# Patient Record
Sex: MALE | Race: WHITE | NOT HISPANIC OR LATINO | Employment: OTHER | URBAN - METROPOLITAN AREA
[De-identification: names, ages, dates, MRNs, and addresses within clinical notes are randomized per-mention and may not be internally consistent; named-entity substitution may affect disease eponyms.]

---

## 2023-04-14 PROBLEM — E88.810 METABOLIC SYNDROME X: Status: ACTIVE | Noted: 2023-04-14

## 2023-04-14 PROBLEM — E53.8 VITAMIN B12 DEFICIENCY: Status: ACTIVE | Noted: 2023-04-14

## 2023-04-14 PROBLEM — M17.0 PRIMARY OSTEOARTHRITIS OF BOTH KNEES: Status: ACTIVE | Noted: 2023-04-14

## 2023-04-14 PROBLEM — G25.81 RESTLESS LEGS SYNDROME: Status: ACTIVE | Noted: 2023-04-14

## 2023-04-14 PROBLEM — G56.02 SEVERE CARPAL TUNNEL SYNDROME OF LEFT WRIST: Status: ACTIVE | Noted: 2023-04-14

## 2023-04-14 PROBLEM — G60.9 IDIOPATHIC PERIPHERAL NEUROPATHY: Status: ACTIVE | Noted: 2023-04-14

## 2023-04-14 PROBLEM — G56.22 ENTRAPMENT OF LEFT ULNAR NERVE: Status: ACTIVE | Noted: 2023-04-14

## 2023-04-14 PROBLEM — D13.91 AUTOSOMAL RECESSIVE COLORECTAL ADENOMATOUS POLYPOSIS ASSOCIATED WITH MUTATION IN MUTYH GENE: Status: ACTIVE | Noted: 2023-04-14

## 2023-04-14 PROBLEM — I87.2 CHRONIC VENOUS INSUFFICIENCY: Status: ACTIVE | Noted: 2023-04-14

## 2023-04-14 PROBLEM — K64.4 EXTERNAL HEMORRHOID: Status: ACTIVE | Noted: 2023-04-14

## 2023-04-14 PROBLEM — Z95.828 PRESENCE OF IVC FILTER: Status: ACTIVE | Noted: 2023-04-14

## 2023-04-14 PROBLEM — M19.042 PRIMARY OSTEOARTHRITIS OF BOTH HANDS: Status: ACTIVE | Noted: 2023-04-14

## 2023-04-14 PROBLEM — K21.9 CHRONIC GERD: Status: ACTIVE | Noted: 2023-04-14

## 2023-04-14 PROBLEM — R06.2: Status: ACTIVE | Noted: 2023-04-14

## 2023-04-14 PROBLEM — D68.51: Status: ACTIVE | Noted: 2023-04-14

## 2023-04-14 PROBLEM — G56.22 ULNAR TUNNEL SYNDROME OF LEFT WRIST: Status: ACTIVE | Noted: 2023-04-14

## 2023-04-14 PROBLEM — H40.2233 CHRONIC PRIMARY ANGLE-CLOSURE GLAUCOMA OF BOTH EYES, SEVERE STAGE: Status: ACTIVE | Noted: 2023-04-14

## 2023-04-14 PROBLEM — M75.100 ROTATOR CUFF TEAR: Status: ACTIVE | Noted: 2023-04-14

## 2023-04-14 PROBLEM — M15.9 GENERALIZED OSTEOARTHRITIS OF MULTIPLE SITES: Status: ACTIVE | Noted: 2023-04-14

## 2023-04-14 PROBLEM — I26.99 PULMONARY EMBOLUS (MULTI): Status: ACTIVE | Noted: 2023-04-14

## 2023-04-14 PROBLEM — M19.041 PRIMARY OSTEOARTHRITIS OF BOTH HANDS: Status: ACTIVE | Noted: 2023-04-14

## 2023-04-14 PROBLEM — Z15.09 BIALLELIC MUTATION OF MUTYH GENE: Status: ACTIVE | Noted: 2023-04-14

## 2023-04-14 PROBLEM — M67.912 TENDINOPATHY OF ROTATOR CUFF, LEFT: Status: ACTIVE | Noted: 2023-04-14

## 2023-04-14 RX ORDER — WARFARIN SODIUM 5 MG/1
1 TABLET ORAL DAILY
COMMUNITY
Start: 2016-01-13 | End: 2023-05-22 | Stop reason: SDUPTHER

## 2023-04-14 RX ORDER — ALBUTEROL SULFATE 90 UG/1
2 AEROSOL, METERED RESPIRATORY (INHALATION) EVERY 4 HOURS PRN
COMMUNITY
Start: 2022-10-05

## 2023-04-14 RX ORDER — DORZOLAMIDE HCL 20 MG/ML
1 SOLUTION/ DROPS OPHTHALMIC
COMMUNITY
Start: 2022-07-20

## 2023-04-14 RX ORDER — WARFARIN 4 MG/1
1 TABLET ORAL DAILY
COMMUNITY
Start: 2020-03-11 | End: 2023-10-09 | Stop reason: SDUPTHER

## 2023-04-14 RX ORDER — TIZANIDINE 4 MG/1
1 TABLET ORAL 3 TIMES DAILY PRN
COMMUNITY
Start: 2020-06-25 | End: 2023-08-24 | Stop reason: SDUPTHER

## 2023-04-14 RX ORDER — TRAMADOL HYDROCHLORIDE 50 MG/1
50 TABLET ORAL 3 TIMES DAILY
COMMUNITY
Start: 2022-02-22 | End: 2023-05-22 | Stop reason: SDUPTHER

## 2023-04-14 RX ORDER — ACETAMINOPHEN, DEXTROMETHORPHAN HBR, DOXYLAMINE SUCCINATE, PHENYLEPHRINE HCL 650; 20; 12.5; 1 MG/30ML; MG/30ML; MG/30ML; MG/30ML
1 SOLUTION ORAL DAILY
COMMUNITY
Start: 2020-11-11

## 2023-04-14 RX ORDER — LATANOPROST 50 UG/ML
SOLUTION/ DROPS OPHTHALMIC
COMMUNITY
Start: 2016-01-13

## 2023-04-14 RX ORDER — OMEPRAZOLE 20 MG/1
CAPSULE, DELAYED RELEASE ORAL
COMMUNITY
Start: 2020-11-11

## 2023-04-14 RX ORDER — NALOXONE HYDROCHLORIDE 4 MG/.1ML
SPRAY NASAL
COMMUNITY
Start: 2020-03-11

## 2023-04-14 RX ORDER — TIMOLOL MALEATE 2.5 MG/ML
SOLUTION/ DROPS OPHTHALMIC
COMMUNITY
Start: 2021-10-25

## 2023-04-14 RX ORDER — AMITRIPTYLINE HYDROCHLORIDE 10 MG/1
1 TABLET, FILM COATED ORAL NIGHTLY
COMMUNITY
Start: 2022-10-05 | End: 2023-10-09

## 2023-04-14 RX ORDER — PRAMIPEXOLE DIHYDROCHLORIDE 0.25 MG/1
1 TABLET ORAL NIGHTLY
COMMUNITY
Start: 2020-02-12 | End: 2024-02-06 | Stop reason: SDUPTHER

## 2023-04-17 ENCOUNTER — APPOINTMENT (OUTPATIENT)
Dept: PRIMARY CARE | Facility: CLINIC | Age: 68
End: 2023-04-17
Payer: MEDICARE

## 2023-05-22 ENCOUNTER — TELEPHONE (OUTPATIENT)
Dept: PRIMARY CARE | Facility: CLINIC | Age: 68
End: 2023-05-22
Payer: MEDICARE

## 2023-05-22 DIAGNOSIS — Z79.891 CHRONIC USE OF OPIATE DRUG FOR THERAPEUTIC PURPOSE: ICD-10-CM

## 2023-05-22 DIAGNOSIS — Z15.09 BIALLELIC MUTATION OF MUTYH GENE: Primary | ICD-10-CM

## 2023-05-22 DIAGNOSIS — D68.51 THROMBOPHILIA ASSOCIATED WITH DOUBLE HETEROZYGOSITY FOR PROTHROMBIN GENE MUTATION AND FACTOR V LEIDEN MUTATION (MULTI): ICD-10-CM

## 2023-05-22 RX ORDER — WARFARIN SODIUM 5 MG/1
5 TABLET ORAL NIGHTLY
Qty: 30 TABLET | Refills: 1 | Status: SHIPPED | OUTPATIENT
Start: 2023-05-22 | End: 2023-07-10

## 2023-05-22 RX ORDER — TRAMADOL HYDROCHLORIDE 50 MG/1
50 TABLET ORAL 3 TIMES DAILY
Qty: 90 TABLET | Refills: 0 | Status: SHIPPED | OUTPATIENT
Start: 2023-05-22 | End: 2023-08-24

## 2023-05-24 ENCOUNTER — OFFICE VISIT (OUTPATIENT)
Dept: PRIMARY CARE | Facility: CLINIC | Age: 68
End: 2023-05-24
Payer: MEDICARE

## 2023-05-24 VITALS — HEART RATE: 61 BPM | OXYGEN SATURATION: 97 % | DIASTOLIC BLOOD PRESSURE: 74 MMHG | SYSTOLIC BLOOD PRESSURE: 120 MMHG

## 2023-05-24 DIAGNOSIS — D13.91 AUTOSOMAL RECESSIVE COLORECTAL ADENOMATOUS POLYPOSIS ASSOCIATED WITH MUTATION IN MUTYH GENE: ICD-10-CM

## 2023-05-24 DIAGNOSIS — G60.9 IDIOPATHIC PERIPHERAL NEUROPATHY: ICD-10-CM

## 2023-05-24 DIAGNOSIS — H40.2233 CHRONIC PRIMARY ANGLE-CLOSURE GLAUCOMA OF BOTH EYES, SEVERE STAGE: ICD-10-CM

## 2023-05-24 DIAGNOSIS — Z15.09 BIALLELIC MUTATION OF MUTYH GENE: ICD-10-CM

## 2023-05-24 DIAGNOSIS — M15.9 GENERALIZED OSTEOARTHRITIS OF MULTIPLE SITES: ICD-10-CM

## 2023-05-24 DIAGNOSIS — D68.51 THROMBOPHILIA ASSOCIATED WITH DOUBLE HETEROZYGOSITY FOR PROTHROMBIN GENE MUTATION AND FACTOR V LEIDEN MUTATION (MULTI): Primary | ICD-10-CM

## 2023-05-24 DIAGNOSIS — I27.82 OTHER CHRONIC PULMONARY EMBOLISM WITHOUT ACUTE COR PULMONALE (MULTI): ICD-10-CM

## 2023-05-24 DIAGNOSIS — E53.8 VITAMIN B12 DEFICIENCY: ICD-10-CM

## 2023-05-24 PROBLEM — R06.2: Status: RESOLVED | Noted: 2023-04-14 | Resolved: 2023-05-24

## 2023-05-24 LAB — POC INR: 2.8 (ref 0.9–1.1)

## 2023-05-24 PROCEDURE — 1159F MED LIST DOCD IN RCRD: CPT | Performed by: INTERNAL MEDICINE

## 2023-05-24 PROCEDURE — 1160F RVW MEDS BY RX/DR IN RCRD: CPT | Performed by: INTERNAL MEDICINE

## 2023-05-24 PROCEDURE — 3008F BODY MASS INDEX DOCD: CPT | Performed by: INTERNAL MEDICINE

## 2023-05-24 PROCEDURE — 1036F TOBACCO NON-USER: CPT | Performed by: INTERNAL MEDICINE

## 2023-05-24 PROCEDURE — 99213 OFFICE O/P EST LOW 20 MIN: CPT | Performed by: INTERNAL MEDICINE

## 2023-05-24 PROCEDURE — 85610 PROTHROMBIN TIME: CPT | Performed by: INTERNAL MEDICINE

## 2023-05-24 ASSESSMENT — ENCOUNTER SYMPTOMS
PHOTOPHOBIA: 1
NECK PAIN: 1
EYE REDNESS: 1
NECK STIFFNESS: 1
ARTHRALGIAS: 1
COUGH: 1

## 2023-05-24 NOTE — PROGRESS NOTES
Subjective   Michi Mckinley is a 68 y.o. male here for follow-up of chronic anticoagulation for factor V Leiden deficiency.  Bleeding signs/symptoms:     Major bleeding event:    Thrombosis signs/symptoms:    Thromboembolic event:      Missed doses:     Medication changes:     Dietary changes:    Bacterial/viral infection: no  Other concerns:      Review of Systems   HENT:  Positive for neck stiffness.    Eyes:  Positive for photophobia, redness and visual disturbance.   Respiratory:  Positive for cough.    Musculoskeletal:  Positive for arthralgias and neck pain.   All other systems reviewed and are negative.      Objective   Current warfarin (COUMADIN) dose: 5 mg Wednesday and Sunday 4 mg rest  Lab Results   Component Value Date    INR 2.8 (A) 05/24/2023       Assessment/Plan    Therapeutic INR for goal of The patient does not have an active anticoagulation episode.    Dose: no change  Next INR: 1 monthSubjective   Reason for Visit: Michi Mckinley is an 68 y.o. male here for a Medicare Wellness visit.          Reviewed all medications by prescribing practitioner or clinical pharmacist (such as prescriptions, OTCs, herbal therapies and supplements) and documented in the medical record.    HPI    Patient Care Team:  Roly Nance DO as PCP - General  Roly Nance DO as PCP - [facility] Attributed Provider     Review of Systems   Eyes:  Positive for photophobia, redness and visual disturbance.   Respiratory:  Positive for cough.    Musculoskeletal:  Positive for arthralgias, neck pain and neck stiffness.   All other systems reviewed and are negative.      Objective   Vitals:  /74   Pulse 61   SpO2 97%       Physical Exam  Vitals and nursing note reviewed.   Constitutional:       General: He is not in acute distress.     Appearance: Normal appearance. He is well-developed. He is not toxic-appearing.   HENT:      Head: Normocephalic and atraumatic.      Right Ear: Tympanic membrane and  external ear normal.      Left Ear: Tympanic membrane and external ear normal.      Nose: Nose normal.      Mouth/Throat:      Mouth: Mucous membranes are moist.      Pharynx: Oropharynx is clear. No oropharyngeal exudate or posterior oropharyngeal erythema.      Tonsils: No tonsillar exudate. 2+ on the right. 2+ on the left.   Eyes:      Extraocular Movements: Extraocular movements intact.      Conjunctiva/sclera: Conjunctivae normal.   Cardiovascular:      Rate and Rhythm: Normal rate and regular rhythm.      Pulses: Normal pulses.      Heart sounds: Normal heart sounds. No murmur heard.  Pulmonary:      Effort: Pulmonary effort is normal.      Breath sounds: Normal breath sounds.   Abdominal:      General: Abdomen is flat. Bowel sounds are normal.      Palpations: Abdomen is soft.   Musculoskeletal:      Cervical back: Neck supple.   Feet:      Right foot:      Skin integrity: Skin integrity normal. No ulcer, blister, skin breakdown, erythema, warmth or callus.      Toenail Condition: Right toenails are normal.      Left foot:      Skin integrity: Skin integrity normal. No ulcer, blister, skin breakdown, erythema, warmth or callus.      Toenail Condition: Left toenails are normal.   Lymphadenopathy:      Cervical: No cervical adenopathy.   Skin:     General: Skin is warm and dry.      Capillary Refill: Capillary refill takes more than 3 seconds.      Findings: No rash.   Neurological:      Mental Status: He is alert. Mental status is at baseline.      Sensory: Sensation is intact.   Psychiatric:         Mood and Affect: Mood normal.         Behavior: Behavior normal.         Thought Content: Thought content normal.         Judgment: Judgment normal.         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

## 2023-07-10 DIAGNOSIS — D68.51 THROMBOPHILIA ASSOCIATED WITH DOUBLE HETEROZYGOSITY FOR PROTHROMBIN GENE MUTATION AND FACTOR V LEIDEN MUTATION (MULTI): ICD-10-CM

## 2023-07-10 DIAGNOSIS — Z15.09 BIALLELIC MUTATION OF MUTYH GENE: ICD-10-CM

## 2023-07-10 RX ORDER — WARFARIN SODIUM 5 MG/1
TABLET ORAL
Qty: 30 TABLET | Refills: 0 | Status: SHIPPED | OUTPATIENT
Start: 2023-07-10 | End: 2023-08-11

## 2023-08-11 DIAGNOSIS — D68.51 THROMBOPHILIA ASSOCIATED WITH DOUBLE HETEROZYGOSITY FOR PROTHROMBIN GENE MUTATION AND FACTOR V LEIDEN MUTATION (MULTI): ICD-10-CM

## 2023-08-11 DIAGNOSIS — Z15.09 BIALLELIC MUTATION OF MUTYH GENE: ICD-10-CM

## 2023-08-11 RX ORDER — WARFARIN SODIUM 5 MG/1
TABLET ORAL
Qty: 30 TABLET | Refills: 2 | Status: SHIPPED | OUTPATIENT
Start: 2023-08-11 | End: 2023-10-09 | Stop reason: ALTCHOICE

## 2023-08-24 ENCOUNTER — OFFICE VISIT (OUTPATIENT)
Dept: PRIMARY CARE | Facility: CLINIC | Age: 68
End: 2023-08-24
Payer: MEDICARE

## 2023-08-24 VITALS
DIASTOLIC BLOOD PRESSURE: 80 MMHG | BODY MASS INDEX: 30.21 KG/M2 | WEIGHT: 211 LBS | HEART RATE: 68 BPM | SYSTOLIC BLOOD PRESSURE: 130 MMHG | HEIGHT: 70 IN

## 2023-08-24 DIAGNOSIS — D13.91 AUTOSOMAL RECESSIVE COLORECTAL ADENOMATOUS POLYPOSIS ASSOCIATED WITH MUTATION IN MUTYH GENE: ICD-10-CM

## 2023-08-24 DIAGNOSIS — H40.2233 CHRONIC PRIMARY ANGLE-CLOSURE GLAUCOMA OF BOTH EYES, SEVERE STAGE: ICD-10-CM

## 2023-08-24 DIAGNOSIS — G60.9 IDIOPATHIC PERIPHERAL NEUROPATHY: ICD-10-CM

## 2023-08-24 DIAGNOSIS — G25.81 RESTLESS LEGS SYNDROME: ICD-10-CM

## 2023-08-24 DIAGNOSIS — E53.8 VITAMIN B12 DEFICIENCY: ICD-10-CM

## 2023-08-24 DIAGNOSIS — D68.51 THROMBOPHILIA ASSOCIATED WITH DOUBLE HETEROZYGOSITY FOR PROTHROMBIN GENE MUTATION AND FACTOR V LEIDEN MUTATION (MULTI): Primary | ICD-10-CM

## 2023-08-24 DIAGNOSIS — M15.9 GENERALIZED OSTEOARTHRITIS OF MULTIPLE SITES: ICD-10-CM

## 2023-08-24 DIAGNOSIS — I27.82 OTHER CHRONIC PULMONARY EMBOLISM WITHOUT ACUTE COR PULMONALE (MULTI): ICD-10-CM

## 2023-08-24 DIAGNOSIS — Z15.09 BIALLELIC MUTATION OF MUTYH GENE: ICD-10-CM

## 2023-08-24 DIAGNOSIS — E66.09 CLASS 1 OBESITY DUE TO EXCESS CALORIES WITH SERIOUS COMORBIDITY AND BODY MASS INDEX (BMI) OF 30.0 TO 30.9 IN ADULT: ICD-10-CM

## 2023-08-24 PROBLEM — E66.811 CLASS 1 OBESITY DUE TO EXCESS CALORIES WITH SERIOUS COMORBIDITY AND BODY MASS INDEX (BMI) OF 30.0 TO 30.9 IN ADULT: Status: ACTIVE | Noted: 2023-08-24

## 2023-08-24 LAB — POC INR: 3.5 (ref 0.9–1.1)

## 2023-08-24 PROCEDURE — 3008F BODY MASS INDEX DOCD: CPT | Performed by: INTERNAL MEDICINE

## 2023-08-24 PROCEDURE — 1036F TOBACCO NON-USER: CPT | Performed by: INTERNAL MEDICINE

## 2023-08-24 PROCEDURE — 99213 OFFICE O/P EST LOW 20 MIN: CPT | Performed by: INTERNAL MEDICINE

## 2023-08-24 PROCEDURE — 1160F RVW MEDS BY RX/DR IN RCRD: CPT | Performed by: INTERNAL MEDICINE

## 2023-08-24 PROCEDURE — 1159F MED LIST DOCD IN RCRD: CPT | Performed by: INTERNAL MEDICINE

## 2023-08-24 PROCEDURE — 85610 PROTHROMBIN TIME: CPT | Performed by: INTERNAL MEDICINE

## 2023-08-24 RX ORDER — TIZANIDINE 4 MG/1
4 TABLET ORAL 3 TIMES DAILY PRN
Qty: 30 TABLET | Refills: 2 | Status: SHIPPED | OUTPATIENT
Start: 2023-08-24 | End: 2024-01-08 | Stop reason: SDUPTHER

## 2023-08-24 RX ORDER — TIZANIDINE 4 MG/1
4 TABLET ORAL 3 TIMES DAILY PRN
Qty: 30 TABLET | Refills: 2 | Status: CANCELLED | OUTPATIENT
Start: 2023-08-24

## 2023-08-24 ASSESSMENT — ANXIETY QUESTIONNAIRES
7. FEELING AFRAID AS IF SOMETHING AWFUL MIGHT HAPPEN: NOT AT ALL
5. BEING SO RESTLESS THAT IT IS HARD TO SIT STILL: NOT AT ALL
2. NOT BEING ABLE TO STOP OR CONTROL WORRYING: NOT AT ALL
GAD7 TOTAL SCORE: 0
1. FEELING NERVOUS, ANXIOUS, OR ON EDGE: NOT AT ALL
6. BECOMING EASILY ANNOYED OR IRRITABLE: NOT AT ALL
3. WORRYING TOO MUCH ABOUT DIFFERENT THINGS: NOT AT ALL
IF YOU CHECKED OFF ANY PROBLEMS ON THIS QUESTIONNAIRE, HOW DIFFICULT HAVE THESE PROBLEMS MADE IT FOR YOU TO DO YOUR WORK, TAKE CARE OF THINGS AT HOME, OR GET ALONG WITH OTHER PEOPLE: NOT DIFFICULT AT ALL
4. TROUBLE RELAXING: NOT AT ALL

## 2023-08-24 ASSESSMENT — PATIENT HEALTH QUESTIONNAIRE - PHQ9
2. FEELING DOWN, DEPRESSED OR HOPELESS: NOT AT ALL
1. LITTLE INTEREST OR PLEASURE IN DOING THINGS: NOT AT ALL
SUM OF ALL RESPONSES TO PHQ9 QUESTIONS 1 AND 2: 0

## 2023-08-24 ASSESSMENT — ENCOUNTER SYMPTOMS
DEPRESSION: 0
OCCASIONAL FEELINGS OF UNSTEADINESS: 0
LOSS OF SENSATION IN FEET: 0

## 2023-08-24 NOTE — PROGRESS NOTES
"Subjective   Patient ID: Michi Mckinley is a 68 y.o. male who presents for Follow-up.  INR 3.5 4mg M,T,Th,F,Sa 5mg Dick,W  HPI     Review of Systems    Objective   /80 (BP Location: Left arm, Patient Position: Sitting)   Pulse 68   Ht 1.765 m (5' 9.5\")   Wt 95.7 kg (211 lb)   BMI 30.71 kg/m²     Physical Exam    Assessment/Plan          "

## 2023-08-24 NOTE — PATIENT INSTRUCTIONS

## 2023-08-24 NOTE — PROGRESS NOTES
Subjective   Michi Mckinley is a 68 y.o. male here for follow-up of chronic anticoagulation for factor V Leiden deficiency.  Bleeding signs/symptoms:     Major bleeding event:    Thrombosis signs/symptoms:    Thromboembolic event:      Missed doses:     Medication changes:     Dietary changes:    Bacterial/viral infection: no  Other concerns:      Review of Systems   All other systems reviewed and are negative.      Objective   Current warfarin (COUMADIN) dose: 5mg daily   Lab Results   Component Value Date    INR 3.5 (A) 08/24/2023    INR 2.8 (A) 05/24/2023       Assessment/Plan    Supratherapeutic INR for goal of The patient does not have an active anticoagulation episode.    Dose:  4mg wed 5mg rest   Next INR: 2 weeksSubjective   Reason for Visit: Michi Mckinley is an 68 y.o. male here for a fu OARRS:  No data recorded  I have personally reviewed the OARRS report for Michi Mckinley. I have considered the risks of abuse, dependence, addiction and diversion    Is the patient prescribed a combination of a benzodiazepine and opioid?  No    Last Urine Drug Screen / ordered today: 1/17/23  No results found for this or any previous visit (from the past 8760 hour(s)).  Results are as expected.   Clinical rationale for not completing a Urine Drug Screen:  pt stoppped tramadol    Controlled Substance Agreement:  Date of the Last Agreement: 1/17/23  Reviewed Controlled Substance Agreement including but not limited to the benefits, risks, and alternatives to treatment with a Controlled Substance medication(s).    Opioids:  What is the patient's goal of therapy? y  Is this being achieved with current treatment? y    I have calculated the patient's Morphine Dose Equivalent (MED):   I have considered referral to Pain Management and/or a specialist, and do not feel it is necessary at this time.    I feel that it is clinically indicated to continue this current medication regimen after consideration of alternative  "therapies, and other non-opioid treatment.    Opioid Risk Screening:  No data recorded    Pain Assessment:  No data recorded visit.     Past Medical, Surgical, and Family History reviewed and updated in chart.    Reviewed all medications by prescribing practitioner or clinical pharmacist (such as prescriptions, OTCs, herbal therapies and supplements) and documented in the medical record.    HPI    Patient Care Team:  Roly Nance DO as PCP - General  Roly Nance DO as PCP - [facility] Attributed Provider     Review of Systems   All other systems reviewed and are negative.      Objective   Vitals:  /80 (BP Location: Left arm, Patient Position: Sitting)   Pulse 68   Ht 1.765 m (5' 9.5\")   Wt 95.7 kg (211 lb)   BMI 30.71 kg/m²       Physical Exam  Vitals and nursing note reviewed.   Constitutional:       General: He is not in acute distress.     Appearance: Normal appearance. He is well-developed. He is not toxic-appearing.   HENT:      Head: Normocephalic and atraumatic.      Right Ear: Tympanic membrane and external ear normal.      Left Ear: Tympanic membrane and external ear normal.      Nose: Nose normal.      Mouth/Throat:      Mouth: Mucous membranes are moist.      Pharynx: Oropharynx is clear. No oropharyngeal exudate or posterior oropharyngeal erythema.      Tonsils: No tonsillar exudate. 2+ on the right. 2+ on the left.   Eyes:      Extraocular Movements: Extraocular movements intact.      Conjunctiva/sclera: Conjunctivae normal.   Cardiovascular:      Rate and Rhythm: Normal rate and regular rhythm.      Pulses: Normal pulses.      Heart sounds: Normal heart sounds. No murmur heard.  Pulmonary:      Effort: Pulmonary effort is normal.      Breath sounds: Normal breath sounds.   Abdominal:      General: Abdomen is flat. Bowel sounds are normal.      Palpations: Abdomen is soft.   Musculoskeletal:      Cervical back: Neck supple.   Feet:      Right foot:      Skin integrity: Skin " integrity normal. No ulcer, blister, skin breakdown, erythema, warmth or callus.      Toenail Condition: Right toenails are normal.      Left foot:      Skin integrity: Skin integrity normal. No ulcer, blister, skin breakdown, erythema, warmth or callus.      Toenail Condition: Left toenails are normal.   Lymphadenopathy:      Cervical: No cervical adenopathy.   Skin:     General: Skin is warm and dry.      Capillary Refill: Capillary refill takes more than 3 seconds.      Findings: No rash.   Neurological:      Mental Status: He is alert. Mental status is at baseline.      Sensory: Sensation is intact.   Psychiatric:         Mood and Affect: Mood normal.         Behavior: Behavior normal.         Thought Content: Thought content normal.         Judgment: Judgment normal.         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 Risk prevention instructions provided.

## 2023-09-08 ENCOUNTER — OFFICE VISIT (OUTPATIENT)
Dept: PRIMARY CARE | Facility: CLINIC | Age: 68
End: 2023-09-08
Payer: MEDICARE

## 2023-09-08 VITALS — SYSTOLIC BLOOD PRESSURE: 130 MMHG | HEART RATE: 68 BPM | DIASTOLIC BLOOD PRESSURE: 80 MMHG

## 2023-09-08 DIAGNOSIS — Z23 FLU VACCINE NEED: ICD-10-CM

## 2023-09-08 DIAGNOSIS — D68.51 THROMBOPHILIA ASSOCIATED WITH DOUBLE HETEROZYGOSITY FOR PROTHROMBIN GENE MUTATION AND FACTOR V LEIDEN MUTATION (MULTI): ICD-10-CM

## 2023-09-08 LAB — POC INR: 2.4 (ref 0.9–1.1)

## 2023-09-08 PROCEDURE — 90662 IIV NO PRSV INCREASED AG IM: CPT | Performed by: INTERNAL MEDICINE

## 2023-09-08 PROCEDURE — 85610 PROTHROMBIN TIME: CPT | Performed by: INTERNAL MEDICINE

## 2023-09-08 PROCEDURE — 99212 OFFICE O/P EST SF 10 MIN: CPT | Performed by: INTERNAL MEDICINE

## 2023-09-08 PROCEDURE — 1160F RVW MEDS BY RX/DR IN RCRD: CPT | Performed by: INTERNAL MEDICINE

## 2023-09-08 PROCEDURE — 1159F MED LIST DOCD IN RCRD: CPT | Performed by: INTERNAL MEDICINE

## 2023-09-08 PROCEDURE — G0008 ADMIN INFLUENZA VIRUS VAC: HCPCS | Performed by: INTERNAL MEDICINE

## 2023-09-08 PROCEDURE — 3008F BODY MASS INDEX DOCD: CPT | Performed by: INTERNAL MEDICINE

## 2023-09-08 PROCEDURE — 1036F TOBACCO NON-USER: CPT | Performed by: INTERNAL MEDICINE

## 2023-09-08 NOTE — PROGRESS NOTES
Subjective   Michi Mckinley is a 68 y.o. male here for follow-up of chronic anticoagulation for factor V Leiden deficiency.  Bleeding signs/symptoms:     Major bleeding event:    Thrombosis signs/symptoms:    Thromboembolic event:      Missed doses:     Medication changes:     Dietary changes:    Bacterial/viral infection: no  Other concerns:      ROS    Objective   Current warfarin (COUMADIN) dose: 4mg daily   Lab Results   Component Value Date    INR 2.4 (A) 09/08/2023    INR 3.5 (A) 08/24/2023    INR 2.8 (A) 05/24/2023       Assessment/Plan    Therapeutic INR for goal of The patient does not have an active anticoagulation episode.    Dose: no change  Next INR: 1 month

## 2023-09-08 NOTE — PROGRESS NOTES
Subjective   Patient ID: Michi Mckinley is a 68 y.o. male who presents for INR (INR/Dose 4mg ).    HPI     Review of Systems    Objective   /80 (BP Location: Right arm)   Pulse 68     Physical Exam    Assessment/Plan   {Assess/PlanSmartLinks:08330}

## 2023-09-08 NOTE — PROGRESS NOTES
Subjective   Patient ID: Michi Mckinley is a 68 y.o. male who presents for INR (INR 2.4/Dose 4mg ).    HPI     Review of Systems    Objective   /80 (BP Location: Right arm)   Pulse 68     Physical Exam    Assessment/Plan

## 2023-10-06 DIAGNOSIS — D68.51 THROMBOPHILIA ASSOCIATED WITH DOUBLE HETEROZYGOSITY FOR PROTHROMBIN GENE MUTATION AND FACTOR V LEIDEN MUTATION (MULTI): ICD-10-CM

## 2023-10-06 DIAGNOSIS — I27.82 OTHER CHRONIC PULMONARY EMBOLISM WITHOUT ACUTE COR PULMONALE (MULTI): ICD-10-CM

## 2023-10-09 ENCOUNTER — OFFICE VISIT (OUTPATIENT)
Dept: PRIMARY CARE | Facility: CLINIC | Age: 68
End: 2023-10-09
Payer: MEDICARE

## 2023-10-09 VITALS
HEART RATE: 68 BPM | WEIGHT: 200 LBS | HEIGHT: 70 IN | BODY MASS INDEX: 28.63 KG/M2 | DIASTOLIC BLOOD PRESSURE: 80 MMHG | SYSTOLIC BLOOD PRESSURE: 124 MMHG

## 2023-10-09 DIAGNOSIS — E55.9 VITAMIN D DEFICIENCY: ICD-10-CM

## 2023-10-09 DIAGNOSIS — Z00.00 MEDICARE ANNUAL WELLNESS VISIT, SUBSEQUENT: Primary | ICD-10-CM

## 2023-10-09 DIAGNOSIS — D68.51 THROMBOPHILIA ASSOCIATED WITH DOUBLE HETEROZYGOSITY FOR PROTHROMBIN GENE MUTATION AND FACTOR V LEIDEN MUTATION (MULTI): ICD-10-CM

## 2023-10-09 DIAGNOSIS — K21.9 CHRONIC GERD: ICD-10-CM

## 2023-10-09 DIAGNOSIS — D13.91 AUTOSOMAL RECESSIVE COLORECTAL ADENOMATOUS POLYPOSIS ASSOCIATED WITH MUTATION IN MUTYH GENE: ICD-10-CM

## 2023-10-09 DIAGNOSIS — Z93.2 ILEOSTOMY PRESENT (MULTI): ICD-10-CM

## 2023-10-09 DIAGNOSIS — R97.20 BPH WITH ELEVATED PSA: ICD-10-CM

## 2023-10-09 DIAGNOSIS — Z23 FLU VACCINE NEED: ICD-10-CM

## 2023-10-09 DIAGNOSIS — G25.81 RESTLESS LEGS SYNDROME: ICD-10-CM

## 2023-10-09 DIAGNOSIS — G60.9 CHRONIC IDIOPATHIC AXONAL POLYNEUROPATHY: ICD-10-CM

## 2023-10-09 DIAGNOSIS — N40.0 BPH WITH ELEVATED PSA: ICD-10-CM

## 2023-10-09 DIAGNOSIS — E53.8 VITAMIN B12 DEFICIENCY: ICD-10-CM

## 2023-10-09 PROBLEM — E78.5 DYSLIPIDEMIA: Status: ACTIVE | Noted: 2023-10-09

## 2023-10-09 PROBLEM — Z86.711 HISTORY OF PULMONARY EMBOLISM: Status: ACTIVE | Noted: 2023-04-14

## 2023-10-09 LAB — POC INR: 3.1 (ref 0.9–1.1)

## 2023-10-09 PROCEDURE — 99214 OFFICE O/P EST MOD 30 MIN: CPT | Performed by: INTERNAL MEDICINE

## 2023-10-09 PROCEDURE — 85610 PROTHROMBIN TIME: CPT | Performed by: INTERNAL MEDICINE

## 2023-10-09 PROCEDURE — G0446 INTENS BEHAVE THER CARDIO DX: HCPCS | Performed by: INTERNAL MEDICINE

## 2023-10-09 PROCEDURE — G0442 ANNUAL ALCOHOL SCREEN 15 MIN: HCPCS | Performed by: INTERNAL MEDICINE

## 2023-10-09 PROCEDURE — 3008F BODY MASS INDEX DOCD: CPT | Performed by: INTERNAL MEDICINE

## 2023-10-09 PROCEDURE — 1170F FXNL STATUS ASSESSED: CPT | Performed by: INTERNAL MEDICINE

## 2023-10-09 PROCEDURE — 1036F TOBACCO NON-USER: CPT | Performed by: INTERNAL MEDICINE

## 2023-10-09 PROCEDURE — 1159F MED LIST DOCD IN RCRD: CPT | Performed by: INTERNAL MEDICINE

## 2023-10-09 PROCEDURE — G0444 DEPRESSION SCREEN ANNUAL: HCPCS | Performed by: INTERNAL MEDICINE

## 2023-10-09 PROCEDURE — 1160F RVW MEDS BY RX/DR IN RCRD: CPT | Performed by: INTERNAL MEDICINE

## 2023-10-09 PROCEDURE — G0439 PPPS, SUBSEQ VISIT: HCPCS | Performed by: INTERNAL MEDICINE

## 2023-10-09 RX ORDER — WARFARIN 4 MG/1
4 TABLET ORAL NIGHTLY
Qty: 30 TABLET | Refills: 3 | Status: SHIPPED | OUTPATIENT
Start: 2023-10-09 | End: 2024-01-26

## 2023-10-09 RX ORDER — AMITRIPTYLINE HYDROCHLORIDE 25 MG/1
25 TABLET, FILM COATED ORAL NIGHTLY
Qty: 30 TABLET | Refills: 11 | Status: SHIPPED | OUTPATIENT
Start: 2023-10-09 | End: 2024-10-08

## 2023-10-09 ASSESSMENT — ANXIETY QUESTIONNAIRES
IF YOU CHECKED OFF ANY PROBLEMS ON THIS QUESTIONNAIRE, HOW DIFFICULT HAVE THESE PROBLEMS MADE IT FOR YOU TO DO YOUR WORK, TAKE CARE OF THINGS AT HOME, OR GET ALONG WITH OTHER PEOPLE: NOT DIFFICULT AT ALL
GAD7 TOTAL SCORE: 0
3. WORRYING TOO MUCH ABOUT DIFFERENT THINGS: NOT AT ALL
4. TROUBLE RELAXING: NOT AT ALL
2. NOT BEING ABLE TO STOP OR CONTROL WORRYING: NOT AT ALL
1. FEELING NERVOUS, ANXIOUS, OR ON EDGE: NOT AT ALL
7. FEELING AFRAID AS IF SOMETHING AWFUL MIGHT HAPPEN: NOT AT ALL
6. BECOMING EASILY ANNOYED OR IRRITABLE: NOT AT ALL
5. BEING SO RESTLESS THAT IT IS HARD TO SIT STILL: NOT AT ALL

## 2023-10-09 ASSESSMENT — ACTIVITIES OF DAILY LIVING (ADL)
TAKING_MEDICATION: INDEPENDENT
GROCERY_SHOPPING: INDEPENDENT
BATHING: INDEPENDENT
MANAGING_FINANCES: INDEPENDENT
DOING_HOUSEWORK: INDEPENDENT
DRESSING: INDEPENDENT

## 2023-10-09 ASSESSMENT — ENCOUNTER SYMPTOMS
LOSS OF SENSATION IN FEET: 0
OCCASIONAL FEELINGS OF UNSTEADINESS: 0
DEPRESSION: 0

## 2023-10-09 ASSESSMENT — PATIENT HEALTH QUESTIONNAIRE - PHQ9
SUM OF ALL RESPONSES TO PHQ9 QUESTIONS 1 AND 2: 0
1. LITTLE INTEREST OR PLEASURE IN DOING THINGS: NOT AT ALL
2. FEELING DOWN, DEPRESSED OR HOPELESS: NOT AT ALL

## 2023-10-09 NOTE — ASSESSMENT & PLAN NOTE
Continue long-term anticoagulation with warfarin 4 mg daily INR therapeutic at 3.1 continue reevaluate 1 month

## 2023-10-09 NOTE — PROGRESS NOTES
"Subjective   Reason for Visit: Michi Mckinley is an 68 y.o. male here for a Medicare Wellness visit.   INR 3.1 Dose 4mg daily       Reviewed all medications by prescribing practitioner or clinical pharmacist (such as prescriptions, OTCs, herbal therapies and supplements) and documented in the medical record.    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

## 2023-10-09 NOTE — ASSESSMENT & PLAN NOTE
Up-to-date with vaccinations check PSA.  Patient does not require further colon cancer screening with history of colectomy with ileostomy due to FAP

## 2023-10-09 NOTE — ASSESSMENT & PLAN NOTE
Continues on vitamin B12 supplementation reevaluate B12 levels in the setting of neuropathy screen for diabetes

## 2023-10-09 NOTE — PROGRESS NOTES
Alcohol consumption becomes hazardous when consuming women [place] over 65 years old greater than 7 drinks per week or greater than 3 drinks per occasion for men greater than 14 drinks per week or greater than 4 drinks per occasion.  I spent 15 minutes screening for alcohol use.Depression and anxiety screening completed   PHQ9 score   GAD7 score   I spent 15 minutes obtaining and discussing depression screening using PHQ 2 questions with results documented in chart.  Screening using PHQ-9 and RIAZ-7 scores were used for follow-up with treatment and referral plan discussed.      I spent 15 minutes face-to-face with this individual discussing the cardiovascular risk and behavioral therapies of nutritional choices exercise and elimination of habits contributing to risk .We agreed on a plan how they may be able to reduce  current cardiovascular risk.  Per patient with calculation greater than 10% aspirin use was discussed and encouraged unless known allergy or increased risk of bleeding contraindicates use patient's 10-year CV risk estimate calculates:I spent greater than 15 minutes discussing advance care planning including the explanation and discussion of advanced directives.  If patient does not have current up-to-date documents examples and information provided on how to create both living will and power of .  toolkit was given to patient and was encouraged to work out completing these documents.Subjective   Reason for Visit: Michi Mckinley is an 68 y.o. male here for a Medicare Wellness visit.     Past Medical, Surgical, and Family History reviewed and updated in chart.    Reviewed all medications by prescribing practitioner or clinical pharmacist (such as prescriptions, OTCs, herbal therapies and supplements) and documented in the medical record.    Patient currently doing okay using medical marijuana for management of chronic glaucoma.  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      Objective   Vitals:  /80 (BP Location: Left arm, Patient Position: Sitting)   Pulse 68   Ht 1.778 m (5' 10\")   Wt 90.7 kg (200 lb)   BMI 28.70 kg/m²       Physical Exam  Vitals and nursing note reviewed.   Constitutional:       General: He is not in acute distress.     Appearance: Normal appearance. He is well-developed. He is not toxic-appearing.   HENT:      Head: Normocephalic and atraumatic.      Right Ear: Tympanic membrane and external ear normal.      Left Ear: Tympanic membrane and external ear normal.      Nose: Nose normal.      Mouth/Throat:      Mouth: Mucous membranes are moist.      Pharynx: Oropharynx is clear. No oropharyngeal exudate or posterior oropharyngeal erythema.      Tonsils: No tonsillar exudate. 2+ on the right. 2+ on the left.   Eyes:      Extraocular Movements: Extraocular movements intact.      Conjunctiva/sclera: Conjunctivae normal.   Cardiovascular:      Rate and Rhythm: Normal rate and regular rhythm.      Pulses: Normal pulses.      Heart sounds: Normal heart sounds. No murmur heard.  Pulmonary:      Effort: Pulmonary effort is normal.      Breath sounds: Normal breath sounds.   Abdominal:      General: Abdomen is flat. Bowel sounds are normal.      Palpations: Abdomen is soft.   Musculoskeletal:      Cervical back: Neck supple.   Feet:      Right foot:      Skin integrity: Skin integrity normal. No ulcer, blister, skin " breakdown, erythema, warmth or callus.      Toenail Condition: Right toenails are normal.      Left foot:      Skin integrity: Skin integrity normal. No ulcer, blister, skin breakdown, erythema, warmth or callus.      Toenail Condition: Left toenails are normal.   Lymphadenopathy:      Cervical: No cervical adenopathy.   Skin:     General: Skin is warm and dry.      Capillary Refill: Capillary refill takes more than 3 seconds.      Findings: No rash.   Neurological:      Mental Status: He is alert. Mental status is at baseline.      Sensory: Sensation is intact.   Psychiatric:         Mood and Affect: Mood normal.         Behavior: Behavior normal.         Thought Content: Thought content normal.         Judgment: Judgment normal.         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    Current Assessment & Plan     Continue active surveillance check CBC         Chronic GERD    Current Assessment & Plan     Symptoms controlled on omeprazole stable         Relevant Medications    amitriptyline (Elavil) 25 mg tablet    Other Relevant Orders    CBC and Auto Differential    Comprehensive metabolic panel    Vitamin B12    Vitamin D 25-Hydroxy,Total (for eval of Vitamin D levels)    Tsh With Reflex To Free T4 If Abnormal    Lipid Panel    PSA    Restless legs syndrome    Current Assessment & Plan     Stable continue pramipexole 0.25 mg nightly         Relevant Medications    amitriptyline (Elavil) 25 mg tablet    Other Relevant Orders    CBC and Auto Differential    Comprehensive metabolic panel    Vitamin B12    Vitamin D 25-Hydroxy,Total (for eval of Vitamin D levels)    Tsh With Reflex To Free T4 If Abnormal    Lipid Panel    PSA    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 factor V Leiden mutation (CMS/HCC)    Current Assessment & Plan     Continue long-term  anticoagulation with warfarin 4 mg daily INR therapeutic at 3.1 continue reevaluate 1 month         Relevant Medications    warfarin (Coumadin) 4 mg tablet    amitriptyline (Elavil) 25 mg tablet    Other Relevant Orders    Follow [facility] - PCP - Established    Follow Up [facility] - PCP - Established    CBC and Auto Differential    Comprehensive metabolic panel    Vitamin B12    Vitamin D 25-Hydroxy,Total (for eval of Vitamin D levels)    Tsh With Reflex To Free T4 If Abnormal    Lipid Panel    PSA    Vitamin B12 deficiency    Current Assessment & Plan     Continues on vitamin B12 supplementation reevaluate B12 levels in the setting of neuropathy screen for diabetes         Adult body mass index 28.0-28.9    Current Assessment & Plan       BMI improved from 30-28 continue to monitor         Relevant Medications    amitriptyline (Elavil) 25 mg tablet    Other Relevant Orders    CBC and Auto Differential    Comprehensive metabolic panel    Vitamin B12    Vitamin D 25-Hydroxy,Total (for eval of Vitamin D levels)    Tsh With Reflex To Free T4 If Abnormal    Lipid Panel    PSA    Medicare annual wellness visit, subsequent - Primary    Current Assessment & Plan     Up-to-date with vaccinations check PSA.  Patient does not require further colon cancer screening with history of colectomy with ileostomy due to FAP         Relevant Medications    warfarin (Coumadin) 4 mg tablet    amitriptyline (Elavil) 25 mg tablet    Other Relevant Orders    [facility] - PCP - Established    Follow Up [facility] - PCP - Established    CBC and Auto Differential    Comprehensive metabolic panel    Vitamin B12    Vitamin D 25-Hydroxy,Total (for eval of Vitamin D levels)    Tsh With Reflex To Free T4 If Abnormal    Lipid Panel    PSA    Flu vaccine need    Relevant Medications    warfarin (Coumadin) 4 mg tablet    amitriptyline (Elavil) 25 mg tablet    Other Relevant Orders     Follow [facility] - PCP - Established    Follow Up [facility] - PCP - Established    CBC and Auto Differential    Comprehensive metabolic panel    Vitamin B12    Vitamin D 25-Hydroxy,Total (for eval of Vitamin D levels)    Tsh With Reflex To Free T4 If Abnormal    Lipid Panel    PSA    Ileostomy present (CMS/HCC)    Current Assessment & Plan     Stable functioning well chats with supportive group for ostomy care         BPH with elevated PSA    Current Assessment & Plan     Reevaluate PSA levels for annual evaluation         Relevant Orders    PSA    Chronic idiopathic axonal polyneuropathy    Relevant Orders    Tsh With Reflex To Free T4 If Abnormal    Vitamin D deficiency    Current Assessment & Plan     Reevaluate supplementation and blood work         Relevant Orders    Vitamin D 25-Hydroxy,Total (for eval of Vitamin D levels)

## 2023-11-06 DIAGNOSIS — D68.51 THROMBOPHILIA ASSOCIATED WITH DOUBLE HETEROZYGOSITY FOR PROTHROMBIN GENE MUTATION AND FACTOR V LEIDEN MUTATION (MULTI): ICD-10-CM

## 2023-11-08 ENCOUNTER — OFFICE VISIT (OUTPATIENT)
Dept: PRIMARY CARE | Facility: CLINIC | Age: 68
End: 2023-11-08
Payer: MEDICARE

## 2023-11-08 VITALS — HEART RATE: 62 BPM | SYSTOLIC BLOOD PRESSURE: 122 MMHG | DIASTOLIC BLOOD PRESSURE: 74 MMHG

## 2023-11-08 DIAGNOSIS — D68.51 THROMBOPHILIA ASSOCIATED WITH DOUBLE HETEROZYGOSITY FOR PROTHROMBIN GENE MUTATION AND FACTOR V LEIDEN MUTATION (MULTI): ICD-10-CM

## 2023-11-08 DIAGNOSIS — Z23 FLU VACCINE NEED: ICD-10-CM

## 2023-11-08 DIAGNOSIS — Z00.00 MEDICARE ANNUAL WELLNESS VISIT, SUBSEQUENT: ICD-10-CM

## 2023-11-08 LAB — POC INR: 3.1 (ref 0.9–1.1)

## 2023-11-08 PROCEDURE — 1160F RVW MEDS BY RX/DR IN RCRD: CPT | Performed by: INTERNAL MEDICINE

## 2023-11-08 PROCEDURE — 85610 PROTHROMBIN TIME: CPT | Performed by: INTERNAL MEDICINE

## 2023-11-08 PROCEDURE — 1159F MED LIST DOCD IN RCRD: CPT | Performed by: INTERNAL MEDICINE

## 2023-11-08 PROCEDURE — 1036F TOBACCO NON-USER: CPT | Performed by: INTERNAL MEDICINE

## 2023-11-08 PROCEDURE — 3008F BODY MASS INDEX DOCD: CPT | Performed by: INTERNAL MEDICINE

## 2023-11-08 PROCEDURE — 99212 OFFICE O/P EST SF 10 MIN: CPT | Performed by: INTERNAL MEDICINE

## 2023-11-08 NOTE — PROGRESS NOTES
Subjective   Michi Mckinley is a 68 y.o. male here for follow-up of chronic anticoagulation for Hyper Coaguable [place].  Bleeding signs/symptoms:     Major bleeding event:    Thrombosis signs/symptoms:    Thromboembolic event:      Missed doses:     Medication changes:     Dietary changes:    Bacterial/viral infection: no  Other concerns:      Review of Systems   All other systems reviewed and are negative.      Objective   Current warfarin (COUMADIN) dose: 4mg daily  Lab Results   Component Value Date    INR 3.1 (A) 11/08/2023    INR 3.1 (A) 10/09/2023    INR 2.4 (A) 09/08/2023       Assessment/Plan    Therapeutic INR for goal of The patient does not have an active anticoagulation episode.    Dose: no change  Next INR: 1 month

## 2023-11-08 NOTE — PROGRESS NOTES
Subjective   Patient ID: Michi Mckinley is a 68 y.o. male who presents for inr (INR 3.1/Dose 4mg daily ).    HPI     Review of Systems    Objective   /74   Pulse 62     Physical Exam    Assessment/Plan

## 2023-12-08 ENCOUNTER — OFFICE VISIT (OUTPATIENT)
Dept: PRIMARY CARE | Facility: CLINIC | Age: 68
End: 2023-12-08
Payer: MEDICARE

## 2023-12-08 VITALS — SYSTOLIC BLOOD PRESSURE: 132 MMHG | DIASTOLIC BLOOD PRESSURE: 98 MMHG

## 2023-12-08 DIAGNOSIS — D68.51 THROMBOPHILIA ASSOCIATED WITH DOUBLE HETEROZYGOSITY FOR PROTHROMBIN GENE MUTATION AND FACTOR V LEIDEN MUTATION (MULTI): ICD-10-CM

## 2023-12-08 LAB — POC INR: 3 (ref 0.9–1.1)

## 2023-12-08 PROCEDURE — 1159F MED LIST DOCD IN RCRD: CPT | Performed by: INTERNAL MEDICINE

## 2023-12-08 PROCEDURE — 85610 PROTHROMBIN TIME: CPT | Performed by: INTERNAL MEDICINE

## 2023-12-08 PROCEDURE — 99212 OFFICE O/P EST SF 10 MIN: CPT | Performed by: INTERNAL MEDICINE

## 2023-12-08 PROCEDURE — 1036F TOBACCO NON-USER: CPT | Performed by: INTERNAL MEDICINE

## 2023-12-08 PROCEDURE — 1160F RVW MEDS BY RX/DR IN RCRD: CPT | Performed by: INTERNAL MEDICINE

## 2023-12-08 PROCEDURE — 3008F BODY MASS INDEX DOCD: CPT | Performed by: INTERNAL MEDICINE

## 2023-12-08 NOTE — PROGRESS NOTES
Subjective   Michi Mckinley is a 68 y.o. male here for follow-up of chronic anticoagulation for Hyper Coaguable [place].  Bleeding signs/symptoms:     Major bleeding event:    Thrombosis signs/symptoms:    Thromboembolic event:      Missed doses:     Medication changes:     Dietary changes:    Bacterial/viral infection: yes - NO  Other concerns:      ROS    Objective   Current warfarin (COUMADIN) dose: 4MG  Lab Results   Component Value Date    INR 3.0 (A) 12/08/2023    INR 3.1 (A) 11/08/2023    INR 3.1 (A) 10/09/2023       Assessment/Plan    Therapeutic INR for goal of The patient does not have an active anticoagulation episode.    Dose: no change  Next INR: 1 month

## 2023-12-08 NOTE — PROGRESS NOTES
Subjective   Patient ID: Michi Mckinley is a 68 y.o. male who presents for INR (INR 3.0/Dose 4mg).    HPI     Review of Systems    Objective   There were no vitals taken for this visit.    Physical Exam    Assessment/Plan

## 2024-01-08 ENCOUNTER — OFFICE VISIT (OUTPATIENT)
Dept: PRIMARY CARE | Facility: CLINIC | Age: 69
End: 2024-01-08
Payer: MEDICARE

## 2024-01-08 VITALS — HEART RATE: 60 BPM | SYSTOLIC BLOOD PRESSURE: 110 MMHG | DIASTOLIC BLOOD PRESSURE: 60 MMHG

## 2024-01-08 DIAGNOSIS — Z93.2 ILEOSTOMY PRESENT (MULTI): ICD-10-CM

## 2024-01-08 DIAGNOSIS — D68.51 THROMBOPHILIA ASSOCIATED WITH DOUBLE HETEROZYGOSITY FOR PROTHROMBIN GENE MUTATION AND FACTOR V LEIDEN MUTATION (MULTI): ICD-10-CM

## 2024-01-08 DIAGNOSIS — M15.9 GENERALIZED OSTEOARTHRITIS OF MULTIPLE SITES: ICD-10-CM

## 2024-01-08 DIAGNOSIS — Z79.01 CHRONIC ANTICOAGULATION: Primary | ICD-10-CM

## 2024-01-08 LAB — POC INR: 2.6 (ref 0.9–1.1)

## 2024-01-08 PROCEDURE — 3008F BODY MASS INDEX DOCD: CPT | Performed by: INTERNAL MEDICINE

## 2024-01-08 PROCEDURE — 1160F RVW MEDS BY RX/DR IN RCRD: CPT | Performed by: INTERNAL MEDICINE

## 2024-01-08 PROCEDURE — 99212 OFFICE O/P EST SF 10 MIN: CPT | Performed by: INTERNAL MEDICINE

## 2024-01-08 PROCEDURE — 1159F MED LIST DOCD IN RCRD: CPT | Performed by: INTERNAL MEDICINE

## 2024-01-08 PROCEDURE — 85610 PROTHROMBIN TIME: CPT | Performed by: INTERNAL MEDICINE

## 2024-01-08 PROCEDURE — 1036F TOBACCO NON-USER: CPT | Performed by: INTERNAL MEDICINE

## 2024-01-08 RX ORDER — TIZANIDINE 4 MG/1
4 TABLET ORAL 3 TIMES DAILY PRN
Qty: 30 TABLET | Refills: 2 | Status: SHIPPED | OUTPATIENT
Start: 2024-01-08 | End: 2024-03-08 | Stop reason: SDUPTHER

## 2024-01-08 NOTE — PROGRESS NOTES
Subjective   Michi Mckinley is a 68 y.o. male here for follow-up of chronic anticoagulation for Deep Vein Thrombosis (DVT) and Hyper Coaguable State.  Bleeding signs/symptoms:     Major bleeding event:    Thrombosis signs/symptoms:    Thromboembolic event:      Missed doses:     Medication changes:     Dietary changes:    Bacterial/viral infection: no  Other concerns:      ROS    Objective   Current warfarin (COUMADIN) dose: 4mg daily  Lab Results   Component Value Date    INR 2.6 (A) 01/08/2024    INR 3.0 (A) 12/08/2023    INR 3.1 (A) 11/08/2023       Assessment/Plan    Therapeutic INR for goal of The patient does not have an active anticoagulation episode.    Dose: no change  Next INR: 1 month

## 2024-01-08 NOTE — PROGRESS NOTES
Subjective   Patient ID: Michi Mckinley is a 68 y.o. male who presents for INR (INR 2.6/Dose 4mg daily ).    HPI     Review of Systems    Objective   /60   Pulse 60     Physical Exam    Assessment/Plan

## 2024-01-26 DIAGNOSIS — Z23 FLU VACCINE NEED: ICD-10-CM

## 2024-01-26 DIAGNOSIS — D68.51 THROMBOPHILIA ASSOCIATED WITH DOUBLE HETEROZYGOSITY FOR PROTHROMBIN GENE MUTATION AND FACTOR V LEIDEN MUTATION (MULTI): ICD-10-CM

## 2024-01-26 DIAGNOSIS — Z00.00 MEDICARE ANNUAL WELLNESS VISIT, SUBSEQUENT: ICD-10-CM

## 2024-01-26 RX ORDER — WARFARIN 4 MG/1
4 TABLET ORAL NIGHTLY
Qty: 30 TABLET | Refills: 2 | Status: SHIPPED | OUTPATIENT
Start: 2024-01-26 | End: 2024-02-06 | Stop reason: SDUPTHER

## 2024-02-06 ENCOUNTER — TELEPHONE (OUTPATIENT)
Dept: PRIMARY CARE | Facility: CLINIC | Age: 69
End: 2024-02-06
Payer: MEDICARE

## 2024-02-06 DIAGNOSIS — D68.51 THROMBOPHILIA ASSOCIATED WITH DOUBLE HETEROZYGOSITY FOR PROTHROMBIN GENE MUTATION AND FACTOR V LEIDEN MUTATION (MULTI): ICD-10-CM

## 2024-02-06 DIAGNOSIS — G25.81 RESTLESS LEGS SYNDROME: ICD-10-CM

## 2024-02-06 DIAGNOSIS — Z23 FLU VACCINE NEED: ICD-10-CM

## 2024-02-06 DIAGNOSIS — Z00.00 MEDICARE ANNUAL WELLNESS VISIT, SUBSEQUENT: ICD-10-CM

## 2024-02-06 RX ORDER — WARFARIN 4 MG/1
4 TABLET ORAL NIGHTLY
Qty: 30 TABLET | Refills: 2 | Status: SHIPPED | OUTPATIENT
Start: 2024-02-06

## 2024-02-06 RX ORDER — PRAMIPEXOLE DIHYDROCHLORIDE 0.25 MG/1
0.25 TABLET ORAL NIGHTLY
Qty: 90 TABLET | Refills: 3 | Status: SHIPPED | OUTPATIENT
Start: 2024-02-06

## 2024-02-06 NOTE — TELEPHONE ENCOUNTER
Refill request:    Warfarin 4mg as directed  Pramipexole 0.25mg every day     Pharmacy: [facility] [place]

## 2024-02-07 DIAGNOSIS — D68.51 THROMBOPHILIA ASSOCIATED WITH DOUBLE HETEROZYGOSITY FOR PROTHROMBIN GENE MUTATION AND FACTOR V LEIDEN MUTATION (MULTI): ICD-10-CM

## 2024-02-07 DIAGNOSIS — Z79.01 CHRONIC ANTICOAGULATION: ICD-10-CM

## 2024-02-08 ENCOUNTER — OFFICE VISIT (OUTPATIENT)
Dept: PRIMARY CARE | Facility: CLINIC | Age: 69
End: 2024-02-08
Payer: MEDICARE

## 2024-02-08 VITALS — HEART RATE: 66 BPM | DIASTOLIC BLOOD PRESSURE: 78 MMHG | SYSTOLIC BLOOD PRESSURE: 112 MMHG

## 2024-02-08 DIAGNOSIS — Z79.01 CHRONIC ANTICOAGULATION: ICD-10-CM

## 2024-02-08 DIAGNOSIS — D68.51 THROMBOPHILIA ASSOCIATED WITH DOUBLE HETEROZYGOSITY FOR PROTHROMBIN GENE MUTATION AND FACTOR V LEIDEN MUTATION (MULTI): ICD-10-CM

## 2024-02-08 DIAGNOSIS — Z93.2 ILEOSTOMY PRESENT (MULTI): ICD-10-CM

## 2024-02-08 LAB — POC INR: 3.3 (ref 0.9–1.1)

## 2024-02-08 PROCEDURE — 1160F RVW MEDS BY RX/DR IN RCRD: CPT | Performed by: INTERNAL MEDICINE

## 2024-02-08 PROCEDURE — 1159F MED LIST DOCD IN RCRD: CPT | Performed by: INTERNAL MEDICINE

## 2024-02-08 PROCEDURE — 1123F ACP DISCUSS/DSCN MKR DOCD: CPT | Performed by: INTERNAL MEDICINE

## 2024-02-08 PROCEDURE — 85610 PROTHROMBIN TIME: CPT | Performed by: INTERNAL MEDICINE

## 2024-02-08 PROCEDURE — 1036F TOBACCO NON-USER: CPT | Performed by: INTERNAL MEDICINE

## 2024-02-08 PROCEDURE — 99212 OFFICE O/P EST SF 10 MIN: CPT | Performed by: INTERNAL MEDICINE

## 2024-02-08 PROCEDURE — 3008F BODY MASS INDEX DOCD: CPT | Performed by: INTERNAL MEDICINE

## 2024-03-06 DIAGNOSIS — Z79.01 CHRONIC ANTICOAGULATION: ICD-10-CM

## 2024-03-06 DIAGNOSIS — D68.51 THROMBOPHILIA ASSOCIATED WITH DOUBLE HETEROZYGOSITY FOR PROTHROMBIN GENE MUTATION AND FACTOR V LEIDEN MUTATION (MULTI): ICD-10-CM

## 2024-03-08 ENCOUNTER — OFFICE VISIT (OUTPATIENT)
Dept: PRIMARY CARE | Facility: CLINIC | Age: 69
End: 2024-03-08
Payer: MEDICARE

## 2024-03-08 VITALS — SYSTOLIC BLOOD PRESSURE: 124 MMHG | DIASTOLIC BLOOD PRESSURE: 82 MMHG | HEART RATE: 66 BPM

## 2024-03-08 DIAGNOSIS — Z79.01 CHRONIC ANTICOAGULATION: Primary | ICD-10-CM

## 2024-03-08 DIAGNOSIS — M15.9 GENERALIZED OSTEOARTHRITIS OF MULTIPLE SITES: ICD-10-CM

## 2024-03-08 DIAGNOSIS — L20.81 ATOPIC NEURODERMATITIS: ICD-10-CM

## 2024-03-08 DIAGNOSIS — D68.51 THROMBOPHILIA ASSOCIATED WITH DOUBLE HETEROZYGOSITY FOR PROTHROMBIN GENE MUTATION AND FACTOR V LEIDEN MUTATION (MULTI): ICD-10-CM

## 2024-03-08 LAB — POC INR: 2.6 (ref 0.9–1.1)

## 2024-03-08 PROCEDURE — 1160F RVW MEDS BY RX/DR IN RCRD: CPT | Performed by: INTERNAL MEDICINE

## 2024-03-08 PROCEDURE — 1123F ACP DISCUSS/DSCN MKR DOCD: CPT | Performed by: INTERNAL MEDICINE

## 2024-03-08 PROCEDURE — 3008F BODY MASS INDEX DOCD: CPT | Performed by: INTERNAL MEDICINE

## 2024-03-08 PROCEDURE — 1159F MED LIST DOCD IN RCRD: CPT | Performed by: INTERNAL MEDICINE

## 2024-03-08 PROCEDURE — 85610 PROTHROMBIN TIME: CPT | Performed by: INTERNAL MEDICINE

## 2024-03-08 PROCEDURE — 1036F TOBACCO NON-USER: CPT | Performed by: INTERNAL MEDICINE

## 2024-03-08 PROCEDURE — 99212 OFFICE O/P EST SF 10 MIN: CPT | Performed by: INTERNAL MEDICINE

## 2024-03-08 RX ORDER — TIZANIDINE 4 MG/1
4 TABLET ORAL 3 TIMES DAILY PRN
Qty: 90 TABLET | Refills: 11 | Status: SHIPPED | OUTPATIENT
Start: 2024-03-08 | End: 2024-04-09 | Stop reason: SDUPTHER

## 2024-03-08 RX ORDER — TRIAMCINOLONE ACETONIDE 1 MG/G
OINTMENT TOPICAL 2 TIMES DAILY PRN
Qty: 15 G | Refills: 5 | Status: SHIPPED | OUTPATIENT
Start: 2024-03-08 | End: 2025-03-08

## 2024-03-08 NOTE — PROGRESS NOTES
Subjective   Patient ID: Michi Mckinley is a 69 y.o. male who presents for INR (INR 2.6/Dose 4mg).    HPI     Review of Systems    Objective   /82   Pulse 66     Physical Exam    Assessment/Plan

## 2024-03-08 NOTE — PROGRESS NOTES
Subjective   Michi Mckinley is a 69 y.o. male here for follow-up of chronic anticoagulation for Deep Vein Thrombosis (DVT), Pulmonary Embolism (PE), and Hyper Coaguable State.  Bleeding signs/symptoms:     Major bleeding event:    Thrombosis signs/symptoms:    Thromboembolic event:      Missed doses:     Medication changes:     Dietary changes:    Bacterial/viral infection: no  Other concerns:      ROS    Objective   Current warfarin (COUMADIN) dose: 4 mg daily  Lab Results   Component Value Date    INR 2.6 (A) 03/08/2024    INR 3.3 (A) 02/08/2024    INR 2.6 (A) 01/08/2024       Assessment/Plan    Therapeutic INR for goal of The patient does not have an active anticoagulation episode.    Dose: no change  Next INR: 1 month

## 2024-03-19 ENCOUNTER — CLINICAL SUPPORT (OUTPATIENT)
Dept: PRIMARY CARE | Facility: CLINIC | Age: 69
End: 2024-03-19
Payer: MEDICARE

## 2024-03-19 DIAGNOSIS — H61.23 BILATERAL IMPACTED CERUMEN: ICD-10-CM

## 2024-03-19 NOTE — PROGRESS NOTES
Subjective   Patient ID: Michi Mckinley is a 69 y.o. male who presents for Nurse Visit (Ear irrigation ).    HPI     Review of Systems    Objective   There were no vitals taken for this visit.    Physical Exam    Assessment/Plan

## 2024-04-05 DIAGNOSIS — Z79.01 CHRONIC ANTICOAGULATION: ICD-10-CM

## 2024-04-05 DIAGNOSIS — D68.51 THROMBOPHILIA ASSOCIATED WITH DOUBLE HETEROZYGOSITY FOR PROTHROMBIN GENE MUTATION AND FACTOR V LEIDEN MUTATION (MULTI): ICD-10-CM

## 2024-04-08 ENCOUNTER — APPOINTMENT (OUTPATIENT)
Dept: PRIMARY CARE | Facility: CLINIC | Age: 69
End: 2024-04-08
Payer: MEDICARE

## 2024-04-09 ENCOUNTER — OFFICE VISIT (OUTPATIENT)
Dept: PRIMARY CARE | Facility: CLINIC | Age: 69
End: 2024-04-09
Payer: MEDICARE

## 2024-04-09 VITALS
DIASTOLIC BLOOD PRESSURE: 80 MMHG | WEIGHT: 200 LBS | HEART RATE: 68 BPM | HEIGHT: 70 IN | SYSTOLIC BLOOD PRESSURE: 130 MMHG | BODY MASS INDEX: 28.63 KG/M2

## 2024-04-09 DIAGNOSIS — D68.51 THROMBOPHILIA ASSOCIATED WITH DOUBLE HETEROZYGOSITY FOR PROTHROMBIN GENE MUTATION AND FACTOR V LEIDEN MUTATION (MULTI): Primary | ICD-10-CM

## 2024-04-09 DIAGNOSIS — G25.81 RESTLESS LEGS SYNDROME: ICD-10-CM

## 2024-04-09 DIAGNOSIS — Z79.01 CHRONIC ANTICOAGULATION: ICD-10-CM

## 2024-04-09 DIAGNOSIS — E78.5 DYSLIPIDEMIA: ICD-10-CM

## 2024-04-09 DIAGNOSIS — R97.20 BPH WITH ELEVATED PSA: ICD-10-CM

## 2024-04-09 DIAGNOSIS — G60.9 IDIOPATHIC PERIPHERAL NEUROPATHY: ICD-10-CM

## 2024-04-09 DIAGNOSIS — Z93.2 ILEOSTOMY PRESENT (MULTI): ICD-10-CM

## 2024-04-09 DIAGNOSIS — M15.9 GENERALIZED OSTEOARTHRITIS OF MULTIPLE SITES: ICD-10-CM

## 2024-04-09 DIAGNOSIS — N40.0 BPH WITH ELEVATED PSA: ICD-10-CM

## 2024-04-09 DIAGNOSIS — H40.2233 CHRONIC PRIMARY ANGLE-CLOSURE GLAUCOMA OF BOTH EYES, SEVERE STAGE: ICD-10-CM

## 2024-04-09 PROBLEM — M75.100 ROTATOR CUFF TEAR: Status: RESOLVED | Noted: 2023-04-14 | Resolved: 2024-04-09

## 2024-04-09 PROBLEM — M17.0 PRIMARY OSTEOARTHRITIS OF BOTH KNEES: Status: RESOLVED | Noted: 2023-04-14 | Resolved: 2024-04-09

## 2024-04-09 PROBLEM — Z23 FLU VACCINE NEED: Status: RESOLVED | Noted: 2023-10-09 | Resolved: 2024-04-09

## 2024-04-09 LAB — POC INR: 2.9 (ref 0.9–1.1)

## 2024-04-09 PROCEDURE — 1160F RVW MEDS BY RX/DR IN RCRD: CPT | Performed by: INTERNAL MEDICINE

## 2024-04-09 PROCEDURE — 1159F MED LIST DOCD IN RCRD: CPT | Performed by: INTERNAL MEDICINE

## 2024-04-09 PROCEDURE — 1123F ACP DISCUSS/DSCN MKR DOCD: CPT | Performed by: INTERNAL MEDICINE

## 2024-04-09 PROCEDURE — 99213 OFFICE O/P EST LOW 20 MIN: CPT | Performed by: INTERNAL MEDICINE

## 2024-04-09 PROCEDURE — 85610 PROTHROMBIN TIME: CPT | Performed by: INTERNAL MEDICINE

## 2024-04-09 PROCEDURE — 1036F TOBACCO NON-USER: CPT | Performed by: INTERNAL MEDICINE

## 2024-04-09 PROCEDURE — 3008F BODY MASS INDEX DOCD: CPT | Performed by: INTERNAL MEDICINE

## 2024-04-09 RX ORDER — TIZANIDINE 4 MG/1
4 TABLET ORAL 3 TIMES DAILY PRN
Qty: 270 TABLET | Refills: 3 | Status: SHIPPED | OUTPATIENT
Start: 2024-04-09 | End: 2025-04-09

## 2024-04-09 ASSESSMENT — ENCOUNTER SYMPTOMS
PHOTOPHOBIA: 1
EYE REDNESS: 1
ARTHRALGIAS: 1

## 2024-04-09 NOTE — PROGRESS NOTES
"Subjective   Reason for Visit: Michi Mckinley is an 69 y.o. male here for a fu visit.     Past Medical, Surgical, and Family History reviewed and updated in chart.    Reviewed all medications by prescribing practitioner or clinical pharmacist (such as prescriptions, OTCs, herbal therapies and supplements) and documented in the medical record.    HPI    Patient Care Team:  Roly Nance DO as PCP - General  Roly Nance DO as PCP - [facility] Attributed Provider     Review of Systems   Eyes:  Positive for photophobia, redness and visual disturbance.   Musculoskeletal:  Positive for arthralgias.   All other systems reviewed and are negative.      Objective   Vitals:  /80   Pulse 68   Ht 1.778 m (5' 10\")   Wt 90.7 kg (200 lb)   BMI 28.70 kg/m²       Physical Exam  Vitals and nursing note reviewed.   Constitutional:       General: He is not in acute distress.     Appearance: Normal appearance. He is well-developed. He is not toxic-appearing.   HENT:      Head: Normocephalic and atraumatic.      Right Ear: Tympanic membrane and external ear normal.      Left Ear: Tympanic membrane and external ear normal.      Nose: Nose normal.      Mouth/Throat:      Mouth: Mucous membranes are moist.      Pharynx: Oropharynx is clear. No oropharyngeal exudate or posterior oropharyngeal erythema.      Tonsils: No tonsillar exudate. 2+ on the right. 2+ on the left.   Eyes:      Extraocular Movements: Extraocular movements intact.      Conjunctiva/sclera: Conjunctivae normal.   Cardiovascular:      Rate and Rhythm: Normal rate and regular rhythm.      Pulses: Normal pulses.      Heart sounds: Normal heart sounds. No murmur heard.  Pulmonary:      Effort: Pulmonary effort is normal.      Breath sounds: Normal breath sounds.   Abdominal:      General: Abdomen is flat. Bowel sounds are normal.      Palpations: Abdomen is soft.   Musculoskeletal:      Cervical back: Neck supple.   Feet:      Right foot:      Skin " integrity: Skin integrity normal. No ulcer, blister, skin breakdown, erythema, warmth or callus.      Toenail Condition: Right toenails are normal.      Left foot:      Skin integrity: Skin integrity normal. No ulcer, blister, skin breakdown, erythema, warmth or callus.      Toenail Condition: Left toenails are normal.   Lymphadenopathy:      Cervical: No cervical adenopathy.   Skin:     General: Skin is warm and dry.      Capillary Refill: Capillary refill takes more than 3 seconds.      Findings: No rash.   Neurological:      Mental Status: He is alert. Mental status is at baseline.      Sensory: Sensation is intact.   Psychiatric:         Mood and Affect: Mood normal.         Behavior: Behavior normal.         Thought Content: Thought content normal.         Judgment: Judgment normal.         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 (CMS/HCC) - Primary    Current Assessment & Plan     Therapeutic INR 2.9 continue warfarin 4 mg daily reevaluate in 1 month         Relevant Orders    Follow Up In [facility] - PCP - Established    Ileostomy present (CMS/HCC)    Current Assessment & Plan     History of total colectomy with history of FAP does not require further colonoscopy evaluation stable functioning stoma appears healthy no issues with colostomy care at this time         BPH with  elevated PSA    Current Assessment & Plan     Reevaluate PSA level with next blood work         Dyslipidemia    Current Assessment & Plan     Reevaluate with fasting lab work for annual Medicare wellness in October         Chronic anticoagulation    Relevant Orders    Follow Up In [facility] - PCP - Established

## 2024-04-09 NOTE — PROGRESS NOTES
"Subjective   Patient ID: Michi Mckinley is a 69 y.o. male who presents for INR (INR 2.9/Dose 4mg daily ) and Follow-up.    HPI     Review of Systems    Objective   /80   Pulse 68   Ht 1.778 m (5' 10\")   Wt 90.7 kg (200 lb)   BMI 28.70 kg/m²     Physical Exam    Assessment/Plan          "

## 2024-04-09 NOTE — ASSESSMENT & PLAN NOTE
History of total colectomy with history of FAP does not require further colonoscopy evaluation stable functioning stoma appears healthy no issues with colostomy care at this time

## 2024-05-09 ENCOUNTER — OFFICE VISIT (OUTPATIENT)
Dept: PRIMARY CARE | Facility: CLINIC | Age: 69
End: 2024-05-09
Payer: MEDICARE

## 2024-05-09 VITALS
HEIGHT: 70 IN | HEART RATE: 68 BPM | SYSTOLIC BLOOD PRESSURE: 128 MMHG | DIASTOLIC BLOOD PRESSURE: 88 MMHG | WEIGHT: 200 LBS | BODY MASS INDEX: 28.63 KG/M2

## 2024-05-09 DIAGNOSIS — Z79.01 CHRONIC ANTICOAGULATION: ICD-10-CM

## 2024-05-09 DIAGNOSIS — D68.51 THROMBOPHILIA ASSOCIATED WITH DOUBLE HETEROZYGOSITY FOR PROTHROMBIN GENE MUTATION AND FACTOR V LEIDEN MUTATION (MULTI): ICD-10-CM

## 2024-05-09 LAB — POC INR: 2.6 (ref 0.9–1.1)

## 2024-05-09 PROCEDURE — 1159F MED LIST DOCD IN RCRD: CPT | Performed by: INTERNAL MEDICINE

## 2024-05-09 PROCEDURE — 3008F BODY MASS INDEX DOCD: CPT | Performed by: INTERNAL MEDICINE

## 2024-05-09 PROCEDURE — 1160F RVW MEDS BY RX/DR IN RCRD: CPT | Performed by: INTERNAL MEDICINE

## 2024-05-09 PROCEDURE — 85610 PROTHROMBIN TIME: CPT | Performed by: INTERNAL MEDICINE

## 2024-05-09 PROCEDURE — 1036F TOBACCO NON-USER: CPT | Performed by: INTERNAL MEDICINE

## 2024-05-09 PROCEDURE — 99212 OFFICE O/P EST SF 10 MIN: CPT | Performed by: INTERNAL MEDICINE

## 2024-05-09 PROCEDURE — 1123F ACP DISCUSS/DSCN MKR DOCD: CPT | Performed by: INTERNAL MEDICINE

## 2024-05-09 RX ORDER — BRIMONIDINE TARTRATE 2 MG/ML
SOLUTION/ DROPS OPHTHALMIC
COMMUNITY
Start: 2024-05-05

## 2024-05-09 NOTE — PROGRESS NOTES
"Subjective   Patient ID: Michi Mckinley is a 69 y.o. male who presents for INR (INR 2.6/DOSE 4MG ).    HPI     Review of Systems    Objective   /88   Pulse 68   Ht 1.778 m (5' 10\")   Wt 90.7 kg (200 lb)   BMI 28.70 kg/m²     Physical Exam    Assessment/Plan          "

## 2024-05-09 NOTE — PROGRESS NOTES
Subjective   Michi Mckinley is a 69 y.o. male here for follow-up of chronic anticoagulation for Deep Vein Thrombosis (DVT) and Hyper Coaguable State.  Bleeding signs/symptoms:     Major bleeding event:    Thrombosis signs/symptoms:    Thromboembolic event:      Missed doses:     Medication changes:     Dietary changes:    Bacterial/viral infection: no  Other concerns:      Review of Systems   All other systems reviewed and are negative.      Objective   Current warfarin (COUMADIN) dose: 4mg daily  Lab Results   Component Value Date    INR 2.6 (A) 05/09/2024    INR 2.9 (A) 04/09/2024    INR 2.6 (A) 03/08/2024       Assessment/Plan    Therapeutic INR for goal of The patient does not have an active anticoagulation episode.    Dose: no change  Next INR: 1 month

## 2024-06-07 DIAGNOSIS — Z79.01 CHRONIC ANTICOAGULATION: ICD-10-CM

## 2024-06-07 DIAGNOSIS — D68.51 THROMBOPHILIA ASSOCIATED WITH DOUBLE HETEROZYGOSITY FOR PROTHROMBIN GENE MUTATION AND FACTOR V LEIDEN MUTATION (MULTI): ICD-10-CM

## 2024-06-10 ENCOUNTER — OFFICE VISIT (OUTPATIENT)
Dept: PRIMARY CARE | Facility: CLINIC | Age: 69
End: 2024-06-10
Payer: MEDICARE

## 2024-06-10 VITALS — SYSTOLIC BLOOD PRESSURE: 132 MMHG | DIASTOLIC BLOOD PRESSURE: 76 MMHG | HEART RATE: 64 BPM

## 2024-06-10 DIAGNOSIS — D68.51 THROMBOPHILIA ASSOCIATED WITH DOUBLE HETEROZYGOSITY FOR PROTHROMBIN GENE MUTATION AND FACTOR V LEIDEN MUTATION (MULTI): Primary | ICD-10-CM

## 2024-06-10 DIAGNOSIS — Z79.01 CHRONIC ANTICOAGULATION: ICD-10-CM

## 2024-06-10 LAB — POC INR: 3.2 (ref 0.9–1.1)

## 2024-06-10 PROCEDURE — 85610 PROTHROMBIN TIME: CPT | Performed by: INTERNAL MEDICINE

## 2024-06-10 PROCEDURE — 1036F TOBACCO NON-USER: CPT | Performed by: INTERNAL MEDICINE

## 2024-06-10 PROCEDURE — 1160F RVW MEDS BY RX/DR IN RCRD: CPT | Performed by: INTERNAL MEDICINE

## 2024-06-10 PROCEDURE — 3008F BODY MASS INDEX DOCD: CPT | Performed by: INTERNAL MEDICINE

## 2024-06-10 PROCEDURE — 99212 OFFICE O/P EST SF 10 MIN: CPT | Performed by: INTERNAL MEDICINE

## 2024-06-10 PROCEDURE — 1159F MED LIST DOCD IN RCRD: CPT | Performed by: INTERNAL MEDICINE

## 2024-06-10 PROCEDURE — 1123F ACP DISCUSS/DSCN MKR DOCD: CPT | Performed by: INTERNAL MEDICINE

## 2024-06-10 NOTE — PROGRESS NOTES
Subjective   Michi Mkcinley is a 69 y.o. male here for follow-up of chronic anticoagulation for Hyper Coaguable State.  Bleeding signs/symptoms:     Major bleeding event:    Thrombosis signs/symptoms:    Thromboembolic event:      Missed doses:     Medication changes:     Dietary changes:    Bacterial/viral infection: no  Other concerns:      Review of Systems   All other systems reviewed and are negative.      Objective   Current warfarin (COUMADIN) dose: 4mg daily   Lab Results   Component Value Date    INR 3.2 (A) 06/10/2024    INR 2.6 (A) 05/09/2024    INR 2.9 (A) 04/09/2024       Assessment/Plan    Therapeutic INR for goal of The patient does not have an active anticoagulation episode.    Dose: no change  Next INR: 1 month

## 2024-06-10 NOTE — PROGRESS NOTES
Subjective   Patient ID: Michi Mckinley is a 69 y.o. male who presents for INR (INR 3.2/Dose 4mg daily ).    HPI     Review of Systems    Objective   /89   Pulse 64     Physical Exam    Assessment/Plan

## 2024-07-03 DIAGNOSIS — Z79.01 CHRONIC ANTICOAGULATION: ICD-10-CM

## 2024-07-03 DIAGNOSIS — D68.51 THROMBOPHILIA ASSOCIATED WITH DOUBLE HETEROZYGOSITY FOR PROTHROMBIN GENE MUTATION AND FACTOR V LEIDEN MUTATION (MULTI): ICD-10-CM

## 2024-07-10 ENCOUNTER — APPOINTMENT (OUTPATIENT)
Dept: PRIMARY CARE | Facility: CLINIC | Age: 69
End: 2024-07-10
Payer: MEDICARE

## 2024-07-10 VITALS
WEIGHT: 200 LBS | BODY MASS INDEX: 28.63 KG/M2 | SYSTOLIC BLOOD PRESSURE: 120 MMHG | DIASTOLIC BLOOD PRESSURE: 88 MMHG | HEIGHT: 70 IN

## 2024-07-10 DIAGNOSIS — Z79.01 CHRONIC ANTICOAGULATION: Primary | ICD-10-CM

## 2024-07-10 DIAGNOSIS — F10.21 HISTORY OF ALCOHOL DEPENDENCE (MULTI): ICD-10-CM

## 2024-07-10 DIAGNOSIS — D68.51 THROMBOPHILIA ASSOCIATED WITH DOUBLE HETEROZYGOSITY FOR PROTHROMBIN GENE MUTATION AND FACTOR V LEIDEN MUTATION (MULTI): ICD-10-CM

## 2024-07-10 LAB — POC INR: 4.4 (ref 0.9–1.1)

## 2024-07-10 PROCEDURE — 1158F ADVNC CARE PLAN TLK DOCD: CPT | Performed by: INTERNAL MEDICINE

## 2024-07-10 PROCEDURE — 1036F TOBACCO NON-USER: CPT | Performed by: INTERNAL MEDICINE

## 2024-07-10 PROCEDURE — 85610 PROTHROMBIN TIME: CPT | Performed by: INTERNAL MEDICINE

## 2024-07-10 PROCEDURE — 99212 OFFICE O/P EST SF 10 MIN: CPT | Performed by: INTERNAL MEDICINE

## 2024-07-10 PROCEDURE — 3008F BODY MASS INDEX DOCD: CPT | Performed by: INTERNAL MEDICINE

## 2024-07-10 PROCEDURE — 1159F MED LIST DOCD IN RCRD: CPT | Performed by: INTERNAL MEDICINE

## 2024-07-10 PROCEDURE — 1123F ACP DISCUSS/DSCN MKR DOCD: CPT | Performed by: INTERNAL MEDICINE

## 2024-07-10 PROCEDURE — 1160F RVW MEDS BY RX/DR IN RCRD: CPT | Performed by: INTERNAL MEDICINE

## 2024-07-10 ASSESSMENT — ENCOUNTER SYMPTOMS
DECREASED APPETITE: 1
DEPRESSION: 1

## 2024-07-10 NOTE — ASSESSMENT & PLAN NOTE
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

## 2024-07-10 NOTE — PROGRESS NOTES
"Subjective   Patient ID: Michi Mckinley is a 69 y.o. male who presents for INR (INR 4.4/Dose 4mg daily ).    HPI     Review of Systems    Objective   /88   Ht 1.778 m (5' 10\")   Wt 90.7 kg (200 lb)   BMI 28.70 kg/m²     Physical Exam    Assessment/Plan          "

## 2024-07-10 NOTE — PROGRESS NOTES
Subjective   Michi Mckinley is a 69 y.o. male here for follow-up of chronic anticoagulation for Hyper Coaguable State.  Bleeding signs/symptoms:     Major bleeding event:    Thrombosis signs/symptoms:    Thromboembolic event:      Missed doses:     Medication changes:     Dietary changes:    Bacterial/viral infection: no  Other concerns:  Patient admits to lack of appetite not eating regularly due to family conflict also not drinking alcohol at this time usually 1 beer a day which may have offset on INR family issues resolved patient offered to get INR reevaluated next week patient would like to stay on current dose which is work will let us know if he has any further issues wishes to come back in 1 month for reevaluation we will hold warfarin for 2 days and reevaluate on 4 mg and recheck next visit    Review of Systems   Constitutional: Positive for decreased appetite.   Psychiatric/Behavioral:  Positive for depression.        Objective   Current warfarin (COUMADIN) dose: 4 mg daily  Lab Results   Component Value Date    INR 4.4 (A) 07/10/2024    INR 3.2 (A) 06/10/2024    INR 2.6 (A) 05/09/2024       Assessment/Plan    Therapeutic INR for goal of The patient does not have an active anticoagulation episode.    Dose:  Hold warfarin 2 days recheck in 1 month  Next INR: 1 month

## 2024-07-21 DIAGNOSIS — D68.51 THROMBOPHILIA ASSOCIATED WITH DOUBLE HETEROZYGOSITY FOR PROTHROMBIN GENE MUTATION AND FACTOR V LEIDEN MUTATION (MULTI): ICD-10-CM

## 2024-07-22 RX ORDER — WARFARIN 4 MG/1
4 TABLET ORAL NIGHTLY
Qty: 30 TABLET | Refills: 11 | Status: SHIPPED | OUTPATIENT
Start: 2024-07-22

## 2024-08-08 DIAGNOSIS — D68.51 THROMBOPHILIA ASSOCIATED WITH DOUBLE HETEROZYGOSITY FOR PROTHROMBIN GENE MUTATION AND FACTOR V LEIDEN MUTATION (MULTI): ICD-10-CM

## 2024-08-08 DIAGNOSIS — Z79.01 CHRONIC ANTICOAGULATION: ICD-10-CM

## 2024-08-09 ENCOUNTER — APPOINTMENT (OUTPATIENT)
Dept: PRIMARY CARE | Facility: CLINIC | Age: 69
End: 2024-08-09
Payer: MEDICARE

## 2024-08-09 VITALS
BODY MASS INDEX: 30.06 KG/M2 | DIASTOLIC BLOOD PRESSURE: 82 MMHG | HEART RATE: 69 BPM | WEIGHT: 210 LBS | SYSTOLIC BLOOD PRESSURE: 142 MMHG | HEIGHT: 70 IN

## 2024-08-09 DIAGNOSIS — D68.51 THROMBOPHILIA ASSOCIATED WITH DOUBLE HETEROZYGOSITY FOR PROTHROMBIN GENE MUTATION AND FACTOR V LEIDEN MUTATION (MULTI): ICD-10-CM

## 2024-08-09 DIAGNOSIS — Z79.01 CHRONIC ANTICOAGULATION: Primary | ICD-10-CM

## 2024-08-09 LAB — POC INR: 2.9 (ref 0.9–1.1)

## 2024-08-09 PROCEDURE — 1123F ACP DISCUSS/DSCN MKR DOCD: CPT | Performed by: INTERNAL MEDICINE

## 2024-08-09 PROCEDURE — 99212 OFFICE O/P EST SF 10 MIN: CPT | Performed by: INTERNAL MEDICINE

## 2024-08-09 PROCEDURE — 1160F RVW MEDS BY RX/DR IN RCRD: CPT | Performed by: INTERNAL MEDICINE

## 2024-08-09 PROCEDURE — 1159F MED LIST DOCD IN RCRD: CPT | Performed by: INTERNAL MEDICINE

## 2024-08-09 PROCEDURE — 85610 PROTHROMBIN TIME: CPT | Performed by: INTERNAL MEDICINE

## 2024-08-09 PROCEDURE — 3008F BODY MASS INDEX DOCD: CPT | Performed by: INTERNAL MEDICINE

## 2024-08-09 PROCEDURE — 1036F TOBACCO NON-USER: CPT | Performed by: INTERNAL MEDICINE

## 2024-08-09 NOTE — PROGRESS NOTES
"Subjective   Patient ID: Michi cMkinley is a 69 y.o. male who presents for INR (INR 2.9/Held dose for 2 days then resumed the 4mg /Needs ostomy supplies bags and skin prep [facility] ).    HPI     Review of Systems    Objective   /82   Pulse 69   Ht 1.778 m (5' 10\")   Wt 95.3 kg (210 lb)   BMI 30.13 kg/m²     Physical Exam    Assessment/Plan          "

## 2024-08-12 NOTE — PROGRESS NOTES
Subjective   Michi Mckinley is a 69 y.o. male here for follow-up of chronic anticoagulation for Hyper Coaguable State.  Bleeding signs/symptoms:     Major bleeding event:    Thrombosis signs/symptoms:    Thromboembolic event:      Missed doses:     Medication changes:     Dietary changes:    Bacterial/viral infection: no  Other concerns:      Review of Systems   All other systems reviewed and are negative.      Objective   Current warfarin (COUMADIN) dose: 4 mg daily  Lab Results   Component Value Date    INR 2.9 (A) 08/09/2024    INR 4.4 (A) 07/10/2024    INR 3.2 (A) 06/10/2024       Assessment/Plan    Therapeutic INR for goal of The patient does not have an active anticoagulation episode.    Dose: no change  Next INR: 1 month

## 2024-09-02 DIAGNOSIS — G25.81 RESTLESS LEGS SYNDROME: ICD-10-CM

## 2024-09-02 DIAGNOSIS — Z23 FLU VACCINE NEED: ICD-10-CM

## 2024-09-02 DIAGNOSIS — Z00.00 MEDICARE ANNUAL WELLNESS VISIT, SUBSEQUENT: ICD-10-CM

## 2024-09-02 DIAGNOSIS — D68.51 THROMBOPHILIA ASSOCIATED WITH DOUBLE HETEROZYGOSITY FOR PROTHROMBIN GENE MUTATION AND FACTOR V LEIDEN MUTATION (MULTI): ICD-10-CM

## 2024-09-02 DIAGNOSIS — K21.9 CHRONIC GERD: ICD-10-CM

## 2024-09-03 RX ORDER — AMITRIPTYLINE HYDROCHLORIDE 25 MG/1
25 TABLET, FILM COATED ORAL NIGHTLY
Qty: 90 TABLET | Refills: 3 | Status: SHIPPED | OUTPATIENT
Start: 2024-09-03

## 2024-09-09 ENCOUNTER — APPOINTMENT (OUTPATIENT)
Dept: PRIMARY CARE | Facility: CLINIC | Age: 69
End: 2024-09-09
Payer: MEDICARE

## 2024-09-09 VITALS — DIASTOLIC BLOOD PRESSURE: 80 MMHG | SYSTOLIC BLOOD PRESSURE: 130 MMHG | HEART RATE: 68 BPM

## 2024-09-09 DIAGNOSIS — Z79.01 CHRONIC ANTICOAGULATION: ICD-10-CM

## 2024-09-09 DIAGNOSIS — Z23 FLU VACCINE NEED: ICD-10-CM

## 2024-09-09 DIAGNOSIS — D68.51 THROMBOPHILIA ASSOCIATED WITH DOUBLE HETEROZYGOSITY FOR PROTHROMBIN GENE MUTATION AND FACTOR V LEIDEN MUTATION (MULTI): ICD-10-CM

## 2024-09-09 LAB — POC INR: 3 (ref 0.9–1.1)

## 2024-09-09 PROCEDURE — 1036F TOBACCO NON-USER: CPT | Performed by: INTERNAL MEDICINE

## 2024-09-09 PROCEDURE — 1123F ACP DISCUSS/DSCN MKR DOCD: CPT | Performed by: INTERNAL MEDICINE

## 2024-09-09 PROCEDURE — 1159F MED LIST DOCD IN RCRD: CPT | Performed by: INTERNAL MEDICINE

## 2024-09-09 PROCEDURE — 1158F ADVNC CARE PLAN TLK DOCD: CPT | Performed by: INTERNAL MEDICINE

## 2024-09-09 PROCEDURE — 99212 OFFICE O/P EST SF 10 MIN: CPT | Performed by: INTERNAL MEDICINE

## 2024-09-09 PROCEDURE — 1160F RVW MEDS BY RX/DR IN RCRD: CPT | Performed by: INTERNAL MEDICINE

## 2024-09-09 PROCEDURE — G0008 ADMIN INFLUENZA VIRUS VAC: HCPCS | Performed by: INTERNAL MEDICINE

## 2024-09-09 PROCEDURE — 90662 IIV NO PRSV INCREASED AG IM: CPT | Performed by: INTERNAL MEDICINE

## 2024-09-09 PROCEDURE — 85610 PROTHROMBIN TIME: CPT | Performed by: INTERNAL MEDICINE

## 2024-09-09 NOTE — PROGRESS NOTES
Subjective   Patient ID: Michi Mckinley is a 69 y.o. male who presents for INR (INR 3.0/Dose 4mg daily ).    HPI     Review of Systems    Objective   /80   Pulse 68     Physical Exam    Assessment/Plan

## 2024-09-09 NOTE — PROGRESS NOTES
Subjective   Michi Mckinley is a 69 y.o. male here for follow-up of chronic anticoagulation for Hyper Coaguable State.  Bleeding signs/symptoms:     Major bleeding event:    Thrombosis signs/symptoms:    Thromboembolic event:      Missed doses:     Medication changes:     Dietary changes:    Bacterial/viral infection: no  Other concerns:      Review of Systems   All other systems reviewed and are negative.      Objective   Current warfarin (COUMADIN) dose: 4mg daily      Assessment/Plan    Therapeutic INR for goal of The patient does not have an active anticoagulation episode.    Dose: no [id number] daily  Next INR: 1 month

## 2024-10-09 ENCOUNTER — APPOINTMENT (OUTPATIENT)
Dept: PRIMARY CARE | Facility: CLINIC | Age: 69
End: 2024-10-09
Payer: MEDICARE

## 2024-10-09 VITALS
HEIGHT: 70 IN | HEART RATE: 64 BPM | SYSTOLIC BLOOD PRESSURE: 124 MMHG | WEIGHT: 210 LBS | DIASTOLIC BLOOD PRESSURE: 80 MMHG | BODY MASS INDEX: 30.06 KG/M2

## 2024-10-09 DIAGNOSIS — Z79.01 CHRONIC ANTICOAGULATION: ICD-10-CM

## 2024-10-09 DIAGNOSIS — E78.5 DYSLIPIDEMIA: ICD-10-CM

## 2024-10-09 DIAGNOSIS — E55.9 VITAMIN D DEFICIENCY: ICD-10-CM

## 2024-10-09 DIAGNOSIS — Z00.00 ROUTINE GENERAL MEDICAL EXAMINATION AT HEALTH CARE FACILITY: ICD-10-CM

## 2024-10-09 DIAGNOSIS — Z00.00 MEDICARE ANNUAL WELLNESS VISIT, SUBSEQUENT: Primary | ICD-10-CM

## 2024-10-09 DIAGNOSIS — R35.1 BPH ASSOCIATED WITH NOCTURIA: ICD-10-CM

## 2024-10-09 DIAGNOSIS — E53.8 VITAMIN B12 DEFICIENCY: ICD-10-CM

## 2024-10-09 DIAGNOSIS — D68.51 THROMBOPHILIA ASSOCIATED WITH DOUBLE HETEROZYGOSITY FOR PROTHROMBIN GENE MUTATION AND FACTOR V LEIDEN MUTATION (MULTI): ICD-10-CM

## 2024-10-09 DIAGNOSIS — M15.9 GENERALIZED OSTEOARTHRITIS OF MULTIPLE SITES: ICD-10-CM

## 2024-10-09 DIAGNOSIS — N40.1 BPH ASSOCIATED WITH NOCTURIA: ICD-10-CM

## 2024-10-09 DIAGNOSIS — R73.02 IGT (IMPAIRED GLUCOSE TOLERANCE): ICD-10-CM

## 2024-10-09 PROBLEM — F10.21 HISTORY OF ALCOHOL DEPENDENCE (MULTI): Status: RESOLVED | Noted: 2024-07-10 | Resolved: 2024-10-09

## 2024-10-09 PROBLEM — Z79.899 MEDICAL CANNABIS USE: Status: ACTIVE | Noted: 2024-10-09

## 2024-10-09 LAB — POC INR: 2.6 (ref 0.9–1.1)

## 2024-10-09 PROCEDURE — 1123F ACP DISCUSS/DSCN MKR DOCD: CPT | Performed by: INTERNAL MEDICINE

## 2024-10-09 PROCEDURE — 3008F BODY MASS INDEX DOCD: CPT | Performed by: INTERNAL MEDICINE

## 2024-10-09 PROCEDURE — 99497 ADVNCD CARE PLAN 30 MIN: CPT | Performed by: INTERNAL MEDICINE

## 2024-10-09 PROCEDURE — 1159F MED LIST DOCD IN RCRD: CPT | Performed by: INTERNAL MEDICINE

## 2024-10-09 PROCEDURE — 1170F FXNL STATUS ASSESSED: CPT | Performed by: INTERNAL MEDICINE

## 2024-10-09 PROCEDURE — G0439 PPPS, SUBSEQ VISIT: HCPCS | Performed by: INTERNAL MEDICINE

## 2024-10-09 PROCEDURE — 1160F RVW MEDS BY RX/DR IN RCRD: CPT | Performed by: INTERNAL MEDICINE

## 2024-10-09 PROCEDURE — 85610 PROTHROMBIN TIME: CPT | Performed by: INTERNAL MEDICINE

## 2024-10-09 PROCEDURE — G0444 DEPRESSION SCREEN ANNUAL: HCPCS | Performed by: INTERNAL MEDICINE

## 2024-10-09 PROCEDURE — 1158F ADVNC CARE PLAN TLK DOCD: CPT | Performed by: INTERNAL MEDICINE

## 2024-10-09 PROCEDURE — 1036F TOBACCO NON-USER: CPT | Performed by: INTERNAL MEDICINE

## 2024-10-09 PROCEDURE — 99214 OFFICE O/P EST MOD 30 MIN: CPT | Performed by: INTERNAL MEDICINE

## 2024-10-09 RX ORDER — TIZANIDINE 4 MG/1
4 TABLET ORAL 3 TIMES DAILY PRN
Qty: 270 TABLET | Refills: 3 | Status: SHIPPED | OUTPATIENT
Start: 2024-10-09 | End: 2025-10-09

## 2024-10-09 ASSESSMENT — ACTIVITIES OF DAILY LIVING (ADL)
GROCERY_SHOPPING: INDEPENDENT
DRESSING: INDEPENDENT
TAKING_MEDICATION: INDEPENDENT
MANAGING_FINANCES: INDEPENDENT
BATHING: INDEPENDENT
DOING_HOUSEWORK: INDEPENDENT

## 2024-10-09 ASSESSMENT — ANXIETY QUESTIONNAIRES
5. BEING SO RESTLESS THAT IT IS HARD TO SIT STILL: NOT AT ALL
3. WORRYING TOO MUCH ABOUT DIFFERENT THINGS: NOT AT ALL
GAD7 TOTAL SCORE: 0
IF YOU CHECKED OFF ANY PROBLEMS ON THIS QUESTIONNAIRE, HOW DIFFICULT HAVE THESE PROBLEMS MADE IT FOR YOU TO DO YOUR WORK, TAKE CARE OF THINGS AT HOME, OR GET ALONG WITH OTHER PEOPLE: NOT DIFFICULT AT ALL
7. FEELING AFRAID AS IF SOMETHING AWFUL MIGHT HAPPEN: NOT AT ALL
1. FEELING NERVOUS, ANXIOUS, OR ON EDGE: NOT AT ALL
2. NOT BEING ABLE TO STOP OR CONTROL WORRYING: NOT AT ALL
6. BECOMING EASILY ANNOYED OR IRRITABLE: NOT AT ALL
4. TROUBLE RELAXING: NOT AT ALL

## 2024-10-09 ASSESSMENT — ENCOUNTER SYMPTOMS
OCCASIONAL FEELINGS OF UNSTEADINESS: 0
LOSS OF SENSATION IN FEET: 0
DEPRESSION: 0

## 2024-10-09 NOTE — ASSESSMENT & PLAN NOTE
Orders:    POCT INR manually resulted; Future    Follow Up In [facility] - PCP - Established    POCT INR manually resulted    
  Orders:    POCT INR manually resulted; Future    Follow [facility] - PCP - Established    POCT INR manually resulted    
.  INR therapeutic at 2.6 on 4 mg warfarin daily continue .  Check in 1 month  Orders:    POCT INR manually resulted; Future    Follow [facility] - PCP - Established    POCT INR manually resulted    Follow Up [facility] - PCP - Established; Future    Hepatitis C antibody; Future    PSA; Future    Comprehensive metabolic panel; Future    CBC and Auto Differential; Future    Lipid Panel; Future    Tsh With Reflex To Free T4 If Abnormal; Future    Vitamin D 25-Hydroxy,Total (for eval of Vitamin D levels); Future    Hemoglobin A1C - Lab collect; Future    
Evaluate general blood work to screen for diabetes dyslipidemia prostate cancer screening patient with ileostomy does not require further screening for colon cancer discussed advanced medical directives with son's medical power of  screening for depression completed       
Given high risk for vitamin D deficiency and exposure to light spends most of time in dark settings at home chronically severe glaucoma  Orders:    Vitamin D 25-Hydroxy,Total (for eval of Vitamin D levels); Future    
INR 2.6 on warfarin anticoagulation therapy stable on 4 mg daily reevaluate in a month  Orders:    POCT INR manually resulted; Future    Follow Up [facility] - PCP - Established    POCT INR manually resulted    Follow Up [facility] - PCP - Established; Future    Hepatitis C antibody; Future    PSA; Future    Comprehensive metabolic panel; Future    CBC and Auto Differential; Future    Lipid Panel; Future    Tsh With Reflex To Free T4 If Abnormal; Future    Vitamin D 25-Hydroxy,Total (for eval of Vitamin D levels); Future    
Patient receiving medical cannabis for treatment of severe glaucoma with indication no evidence of abuse pattern or severe impairment  
Screening fasting lipids  Orders:    Lipid Panel; Future    Tsh With Reflex To Free T4 If Abnormal; Future    Hemoglobin A1C - Lab collect; Future    
Screening for impaired fasting sugar with general blood work  Orders:    Hemoglobin A1C - Lab collect; Future    
Stable at this time refill tizanidine  Orders:    tiZANidine (Zanaflex) 4 mg tablet; Take 1 tablet (4 mg) by mouth 3 times a day as needed for muscle spasms.    Follow Up In [facility] - PCP - Established; Future    Hepatitis C antibody; Future    PSA; Future    Comprehensive metabolic panel; Future    CBC and Auto Differential; Future    Lipid Panel; Future    Tsh With Reflex To Free T4 If Abnormal; Future    Vitamin D 25-Hydroxy,Total (for eval of Vitamin D levels); Future    Hemoglobin A1C - Lab collect; Future    
Trial of tamsulosin ineffective patient not having major urinary difficulties check PSA for prostate cancer screening  Orders:    PSA; Future    
no

## 2024-10-09 NOTE — PROGRESS NOTES
Subjective   Michi Mckinley is a 69 y.o. male here for follow-up of chronic anticoagulation for Hyper Coaguable State.  Bleeding signs/symptoms:     Major bleeding event:    Thrombosis signs/symptoms:    Thromboembolic event:      Missed doses:     Medication changes:     Dietary changes:    Bacterial/viral infection: no  Other concerns:      Review of Systems   All other systems reviewed and are negative.      Objective   Current warfarin (COUMADIN) dose: 5mg daily  Lab Results   Component Value Date    INR 2.6 (A) 10/09/2024    INR 3.0 (A) 09/09/2024    INR 2.9 (A) 08/09/2024       Assessment/Plan    Therapeutic INR for goal of The patient does not have an active anticoagulation episode.    Dose: no change  Next INR: 1 month

## 2024-10-09 NOTE — PROGRESS NOTES
"Subjective   Reason for Visit: Michi Mckinley is an 69 y.o. male here for a Medicare Wellness visit.   INR 2.6  Dose 4mg daily       Reviewed all medications by prescribing practitioner or clinical pharmacist (such as prescriptions, OTCs, herbal therapies and supplements) and documented in the medical record.    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

## 2024-10-09 NOTE — PROGRESS NOTES
"Depression and anxiety screening completed   PHQ9 score   GAD7 score   I spent 15 minutes obtaining and discussing depression screening using PHQ 2 questions with results documented in chart.  Screening using PHQ-9 and RIAZ-7 scores were used for follow-up with treatment and referral plan discussed.      I spent greater than 15 minutes face-to-face with individual providing recommendations for nutrition choices and exercise plan to help achieve weight reductionI spent greater than 15 minutes discussing advance care planning including the explanation and discussion of advanced directives.  If patient does not have current up-to-date documents examples and information provided on how to create both living will and power of .  toolkit was given to patient and was encouraged to work out completing these documents.Subjective   Reason for Visit: Michi Mckinley is an 69 y.o. male here for a Medicare Wellness visit.     Past Medical, Surgical, and Family History reviewed and updated in chart.    Reviewed all medications by prescribing practitioner or clinical pharmacist (such as prescriptions, OTCs, herbal therapies and supplements) and documented in the medical record.    HPI    Patient Care Team:  Roly Nance DO as PCP - General  [facility] Nance [facility]     Review of Systems   All other systems reviewed and are negative.      Objective   Vitals:  /80   Pulse 64   Ht 1.778 m (5' 10\")   Wt 95.3 kg (210 lb)   BMI 30.13 kg/m²       Physical Exam  Vitals and nursing note reviewed.   Constitutional:       General: He is not in acute distress.     Appearance: Normal appearance. He is well-developed. He is not toxic-appearing.   HENT:      Head: Normocephalic and atraumatic.      Right Ear: Tympanic membrane and external ear normal.      Left Ear: Tympanic membrane and external ear normal.      Nose: Nose normal.      Mouth/Throat:      Mouth: Mucous membranes " are moist.      Pharynx: Oropharynx is clear. No oropharyngeal exudate or posterior oropharyngeal erythema.      Tonsils: No tonsillar exudate. 2+ on the right. 2+ on the left.   Eyes:      Extraocular Movements: Extraocular movements intact.      Conjunctiva/sclera: Conjunctivae normal.   Cardiovascular:      Rate and Rhythm: Normal rate and regular rhythm.      Pulses: Normal pulses.      Heart sounds: Normal heart sounds. No murmur heard.  Pulmonary:      Effort: Pulmonary effort is normal.      Breath sounds: Normal breath sounds.   Abdominal:      General: Abdomen is flat. Bowel sounds are normal.      Palpations: Abdomen is soft.   Musculoskeletal:      Cervical back: Neck supple.   Feet:      Right foot:      Skin integrity: Skin integrity normal. No ulcer, blister, skin breakdown, erythema, warmth or callus.      Toenail Condition: Right toenails are normal.      Left foot:      Skin integrity: Skin integrity normal. No ulcer, blister, skin breakdown, erythema, warmth or callus.      Toenail Condition: Left toenails are normal.   Lymphadenopathy:      Cervical: No cervical adenopathy.   Skin:     General: Skin is warm and dry.      Capillary Refill: Capillary refill takes more than 3 seconds.      Findings: No rash.   Neurological:      Mental Status: He is alert. Mental status is at baseline.      Sensory: Sensation is intact.   Psychiatric:         Mood and Affect: Mood normal.         Behavior: Behavior normal.         Thought Content: Thought content normal.         Judgment: Judgment normal.         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  INR therapeutic at 2.6 on 4 mg warfarin daily continue .  Check in 1 month  Orders:    POCT INR manually resulted; Future    Follow Up In [facility] - PCP - Established    POCT INR manually resulted    Follow Up In [facility] - PCP - Established;  Future    Hepatitis C antibody; Future    PSA; Future    Comprehensive metabolic panel; Future    CBC and Auto Differential; Future    Lipid Panel; Future    Tsh With Reflex To Free T4 If Abnormal; Future    Vitamin D 25-Hydroxy,Total (for eval of Vitamin D levels); Future    Hemoglobin A1C - Lab collect; Future    Chronic anticoagulation  INR 2.6 on warfarin anticoagulation therapy stable on 4 mg daily reevaluate in a month  Orders:    POCT INR manually resulted; Future    Follow Up In [facility] - PCP - Established    POCT INR manually resulted    Follow Up In [facility] - PCP - Established; Future    Hepatitis C antibody; Future    PSA; Future    Comprehensive metabolic panel; Future    CBC and Auto Differential; Future    Lipid Panel; Future    Tsh With Reflex To Free T4 If Abnormal; Future    Vitamin D 25-Hydroxy,Total (for eval of Vitamin D levels); Future    Generalized osteoarthritis of multiple sites  Stable at this time refill tizanidine  Orders:    tiZANidine (Zanaflex) 4 mg tablet; Take 1 tablet (4 mg) by mouth 3 times a day as needed for muscle spasms.    Follow Up In [facility] - PCP - Established; Future    Hepatitis C antibody; Future    PSA; Future    Comprehensive metabolic panel; Future    CBC and Auto Differential; Future    Lipid Panel; Future    Tsh With Reflex To Free T4 If Abnormal; Future    Vitamin D 25-Hydroxy,Total (for eval of Vitamin D levels); Future    Hemoglobin A1C - Lab collect; Future    Medicare annual wellness visit, subsequent  Evaluate general blood work to screen for diabetes dyslipidemia prostate cancer screening patient with ileostomy does not require further screening for colon cancer discussed advanced medical directives with son's medical power of  screening for depression completed       BPH associated with nocturia  Trial of tamsulosin ineffective patient not having major urinary difficulties check PSA for prostate cancer  screening  Orders:    PSA; Future    Dyslipidemia  Screening fasting lipids  Orders:    Lipid Panel; Future    Tsh With Reflex To Free T4 If Abnormal; Future    Hemoglobin A1C - Lab collect; Future    Vitamin D deficiency  Given high risk for vitamin D deficiency and exposure to light spends most of time in dark settings at home chronically severe glaucoma  Orders:    Vitamin D 25-Hydroxy,Total (for eval of Vitamin D levels); Future    IGT (impaired glucose tolerance)  Screening for impaired fasting sugar with general blood work  Orders:    Hemoglobin A1C - Lab collect; Future    Routine general medical examination at health care facility  Up-to-date with vaccinations discussed advanced medical directives  Orders:    1 Year Follow Up In [facility] - PCP - Wellness Exam; Future    Vitamin B12 deficiency                Advance Directives Discussion  16 - 20 minutes were spent discussing Advanced Care Planning (including a Living Will, Medical Power Of , as well as specific end of life choices and/or directives). The details of that discussion were documented in Advanced Directives Discussion section of the medical record.     Cardiac Risk Assessment  15 - 20 minutes were spent discussing Cardiovascular risk and, if needed, lifestyle modifications were recommended, including nutritional choices, exercise, and elimination of habits contributing to risk.   Aspirin use/disuse was discussed following the guidelines below:  low dose ASA ( mg) should be considered:    If prior Heart Attack/Stroke/Peripheral vascular disease:  Generally recommend daily low dose aspirin unless extremely high bleeding risk (e.g., gastrointestinal).    If no prior Heart Attack/Stroke/Peripheral vascular disease:              Age over 70: Do not use Aspirin for prevention    Age less than 70 and 10-year cardiovascular disease risk is >20%: use low dose Aspirin for prevention.                 Depression Screening  5 - 10  minutes were spent screening for depression.

## 2024-10-31 ENCOUNTER — LAB (OUTPATIENT)
Dept: LAB | Facility: LAB | Age: 69
End: 2024-10-31
Payer: MEDICARE

## 2024-10-31 DIAGNOSIS — N40.1 BPH ASSOCIATED WITH NOCTURIA: ICD-10-CM

## 2024-10-31 DIAGNOSIS — M15.9 GENERALIZED OSTEOARTHRITIS OF MULTIPLE SITES: ICD-10-CM

## 2024-10-31 DIAGNOSIS — D68.51 THROMBOPHILIA ASSOCIATED WITH DOUBLE HETEROZYGOSITY FOR PROTHROMBIN GENE MUTATION AND FACTOR V LEIDEN MUTATION (MULTI): ICD-10-CM

## 2024-10-31 DIAGNOSIS — E55.9 VITAMIN D DEFICIENCY: ICD-10-CM

## 2024-10-31 DIAGNOSIS — Z79.01 CHRONIC ANTICOAGULATION: ICD-10-CM

## 2024-10-31 DIAGNOSIS — R73.02 IGT (IMPAIRED GLUCOSE TOLERANCE): ICD-10-CM

## 2024-10-31 DIAGNOSIS — E78.5 DYSLIPIDEMIA: ICD-10-CM

## 2024-10-31 DIAGNOSIS — R35.1 BPH ASSOCIATED WITH NOCTURIA: ICD-10-CM

## 2024-10-31 LAB
25(OH)D3 SERPL-MCNC: 21 NG/ML (ref 30–100)
ALBUMIN SERPL BCP-MCNC: 4.6 G/DL (ref 3.4–5)
ALP SERPL-CCNC: 63 U/L (ref 33–136)
ALT SERPL W P-5'-P-CCNC: 31 U/L (ref 10–52)
ANION GAP SERPL CALC-SCNC: 10 MMOL/L (ref 10–20)
AST SERPL W P-5'-P-CCNC: 27 U/L (ref 9–39)
BASOPHILS # BLD AUTO: 0.07 X10*3/UL (ref 0–0.1)
BASOPHILS NFR BLD AUTO: 0.8 %
BILIRUB SERPL-MCNC: 0.8 MG/DL (ref 0–1.2)
BUN SERPL-MCNC: 13 MG/DL (ref 6–23)
CALCIUM SERPL-MCNC: 8.8 MG/DL (ref 8.6–10.3)
CHLORIDE SERPL-SCNC: 103 MMOL/L (ref 98–107)
CHOLEST SERPL-MCNC: 187 MG/DL (ref 0–199)
CHOLESTEROL/HDL RATIO: 4.9
CO2 SERPL-SCNC: 25 MMOL/L (ref 21–32)
CREAT SERPL-MCNC: 1.24 MG/DL (ref 0.5–1.3)
EGFRCR SERPLBLD CKD-EPI 2021: 63 ML/MIN/1.73M*2
EOSINOPHIL # BLD AUTO: 0.23 X10*3/UL (ref 0–0.7)
EOSINOPHIL NFR BLD AUTO: 2.5 %
ERYTHROCYTE [DISTWIDTH] IN BLOOD BY AUTOMATED COUNT: 13.2 % (ref 11.5–14.5)
EST. AVERAGE GLUCOSE BLD GHB EST-MCNC: 114 MG/DL
GLUCOSE SERPL-MCNC: 103 MG/DL (ref 74–99)
HBA1C MFR BLD: 5.6 %
HCT VFR BLD AUTO: 51.2 % (ref 41–52)
HCV AB SER QL: NONREACTIVE
HDLC SERPL-MCNC: 37.8 MG/DL
HGB BLD-MCNC: 17.6 G/DL (ref 13.5–17.5)
IMM GRANULOCYTES # BLD AUTO: 0.04 X10*3/UL (ref 0–0.7)
IMM GRANULOCYTES NFR BLD AUTO: 0.4 % (ref 0–0.9)
LDLC SERPL CALC-MCNC: 106 MG/DL
LYMPHOCYTES # BLD AUTO: 2.07 X10*3/UL (ref 1.2–4.8)
LYMPHOCYTES NFR BLD AUTO: 22.4 %
MCH RBC QN AUTO: 34 PG (ref 26–34)
MCHC RBC AUTO-ENTMCNC: 34.4 G/DL (ref 32–36)
MCV RBC AUTO: 99 FL (ref 80–100)
MONOCYTES # BLD AUTO: 0.81 X10*3/UL (ref 0.1–1)
MONOCYTES NFR BLD AUTO: 8.8 %
NEUTROPHILS # BLD AUTO: 6.03 X10*3/UL (ref 1.2–7.7)
NEUTROPHILS NFR BLD AUTO: 65.1 %
NON HDL CHOLESTEROL: 149 MG/DL (ref 0–149)
NRBC BLD-RTO: 0 /100 WBCS (ref 0–0)
PLATELET # BLD AUTO: 186 X10*3/UL (ref 150–450)
POTASSIUM SERPL-SCNC: 4.2 MMOL/L (ref 3.5–5.3)
PROT SERPL-MCNC: 6.9 G/DL (ref 6.4–8.2)
PSA SERPL-MCNC: 0.57 NG/ML
RBC # BLD AUTO: 5.18 X10*6/UL (ref 4.5–5.9)
SODIUM SERPL-SCNC: 134 MMOL/L (ref 136–145)
TRIGL SERPL-MCNC: 215 MG/DL (ref 0–149)
TSH SERPL-ACNC: 2.44 MIU/L (ref 0.44–3.98)
VLDL: 43 MG/DL (ref 0–40)
WBC # BLD AUTO: 9.3 X10*3/UL (ref 4.4–11.3)

## 2024-10-31 PROCEDURE — 86803 HEPATITIS C AB TEST: CPT

## 2024-10-31 PROCEDURE — 82306 VITAMIN D 25 HYDROXY: CPT

## 2024-10-31 PROCEDURE — 84443 ASSAY THYROID STIM HORMONE: CPT

## 2024-10-31 PROCEDURE — 83036 HEMOGLOBIN GLYCOSYLATED A1C: CPT

## 2024-10-31 PROCEDURE — 80053 COMPREHEN METABOLIC PANEL: CPT

## 2024-10-31 PROCEDURE — 85025 COMPLETE CBC W/AUTO DIFF WBC: CPT

## 2024-10-31 PROCEDURE — 80061 LIPID PANEL: CPT

## 2024-10-31 PROCEDURE — 36415 COLL VENOUS BLD VENIPUNCTURE: CPT

## 2024-10-31 PROCEDURE — 84153 ASSAY OF PSA TOTAL: CPT

## 2024-11-11 ENCOUNTER — APPOINTMENT (OUTPATIENT)
Dept: PRIMARY CARE | Facility: CLINIC | Age: 69
End: 2024-11-11
Payer: MEDICARE

## 2024-11-11 DIAGNOSIS — M15.9 GENERALIZED OSTEOARTHRITIS OF MULTIPLE SITES: ICD-10-CM

## 2024-11-11 DIAGNOSIS — D68.51 THROMBOPHILIA ASSOCIATED WITH DOUBLE HETEROZYGOSITY FOR PROTHROMBIN GENE MUTATION AND FACTOR V LEIDEN MUTATION (MULTI): ICD-10-CM

## 2024-11-11 DIAGNOSIS — Z79.01 CHRONIC ANTICOAGULATION: Primary | ICD-10-CM

## 2024-11-11 DIAGNOSIS — Z93.2 ILEOSTOMY PRESENT (MULTI): ICD-10-CM

## 2024-11-11 LAB — POC INR: 3.3 (ref 0.9–1.1)

## 2024-11-11 PROCEDURE — 1123F ACP DISCUSS/DSCN MKR DOCD: CPT | Performed by: INTERNAL MEDICINE

## 2024-11-11 PROCEDURE — 99213 OFFICE O/P EST LOW 20 MIN: CPT | Performed by: INTERNAL MEDICINE

## 2024-11-11 PROCEDURE — 85610 PROTHROMBIN TIME: CPT | Performed by: INTERNAL MEDICINE

## 2024-11-11 PROCEDURE — 1036F TOBACCO NON-USER: CPT | Performed by: INTERNAL MEDICINE

## 2024-11-11 PROCEDURE — 1159F MED LIST DOCD IN RCRD: CPT | Performed by: INTERNAL MEDICINE

## 2024-11-11 PROCEDURE — 1160F RVW MEDS BY RX/DR IN RCRD: CPT | Performed by: INTERNAL MEDICINE

## 2024-11-11 NOTE — PROGRESS NOTES
Subjective   Michi Mckinley is a 69 y.o. male here for follow-up of chronic anticoagulation for Hyper Coaguable State.  Bleeding signs/symptoms:     Major bleeding event:    Thrombosis signs/symptoms:    Thromboembolic event:      Missed doses:     Medication changes:     Dietary changes:    Bacterial/viral infection: no  Other concerns:      Review of Systems   All other systems reviewed and are negative.      Objective   Current warfarin (COUMADIN) dose: 4mg daily  Lab Results   Component Value Date    INR 3.3 (A) 11/11/2024    INR 2.6 (A) 10/09/2024    INR 3.0 (A) 09/09/2024       Assessment/Plan    Michi was seen today for inr.  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    Dose: no change  Next INR: 1 month

## 2024-11-11 NOTE — PROGRESS NOTES
Subjective   Patient ID: Michi Mckinley is a 69 y.o. male who presents for INR (INR 3.3/Dose  4 mg warfarin daily ).    HPI     Review of Systems    Objective   There were no vitals taken for this visit.    Physical Exam    Assessment/Plan

## 2024-11-11 NOTE — ADDENDUM NOTE
Addended by: MONROE RECINOS on: 11/11/2024 01:10 PM     Modules accepted: Orders, Level of Service

## 2024-11-22 ENCOUNTER — CLINICAL SUPPORT (OUTPATIENT)
Dept: SURGERY | Facility: CLINIC | Age: 69
End: 2024-11-22
Payer: MEDICARE

## 2024-11-22 DIAGNOSIS — Z93.2 ILEOSTOMY PRESENT (MULTI): ICD-10-CM

## 2024-11-22 PROCEDURE — 99211 OFF/OP EST MAY X REQ PHY/QHP: CPT | Performed by: INTERNAL MEDICINE

## 2024-11-22 NOTE — NURSING NOTE
"[facility] nursing visit outcome: Mr. Mckinley came to the stoma clinic d/t concerns about thinning skin around his ileostomy. He noticed this about 4-5 months ago. [facility] RN recommended using a larger, oval aperture for his pouch and also to add stoma paste to improve the pouch seal. Mr. Mckinley verbalized understanding of both recommendations. His supplier carries the stoma paste and a pattern for the new pouch aperture was provided.     [facility] next scheduled visit/plan: Mr. Mckinley will call stoma nurse as needed for other stoma related questions/concerns    Stoma Type: End Ileostomy  Chiki: No  Diameter: 1 x 1 1/4\"  Location: RLQ  Protrusion: Protrudes slightly and functional lumen points laterally  Mucosal Condition and Color: Moist, Red  Mucocutaneous Junction: Intact  Peristomal Skin: Irritant Dermatitis/Denuded and is very slight  - no thinning skin noted, but flat mucosal tissue extends medially from the apex of the stoma  Location of Skin Impairment: superior side of the stoma  Peristomal Contour: Rounded and with occasional lateral dip with peristalsis  Supportive Tissue: Combination semi-soft to firm  Character of Output:  \"liquidy\"  Emptying Frequency: about every 4 hours  Removed/Current Pouching System: [employer] Active Life flat, 1-piece pouch (precut - to cutting hole) with clip closure. Wears pouch nearly horizontally with emptying end towards midline. The position also is better for when he is sleeping. (Mr. Mckinley loves this pouch)  Current Wearing Time: 30-48 hours; changes whenever he feels itching around the stoma    Recommendations:   Skin Care: stoma powder to denuded skin as needed with pouch change - seal with [employer] skin protectant (Mr. Mckinley likes this wipe and uses it with each change)  Pouching System: make aperture oval, like the shape of the stoma; use stoma paste to provide better seal   Wear Time: goal of 2 Days reliably; longer if seal is well maintained and skin stays " in good condition  Other: continue to use the Ostomy Secrets Wraps (he also loves this wrap)    Photo: 11/22/2024     Supplier:  [employer]      Comments: Mr. Mckinley verbalized understanding of using the oval aperture and the stoma paste. He knows he can get the paste from his supplier.     Time Increment: 60 minutes    Susy Viramontes, ROJELION, RN, CWOCN

## 2024-12-12 ENCOUNTER — APPOINTMENT (OUTPATIENT)
Dept: PRIMARY CARE | Facility: CLINIC | Age: 69
End: 2024-12-12
Payer: MEDICARE

## 2024-12-12 DIAGNOSIS — Z79.01 CHRONIC ANTICOAGULATION: Primary | ICD-10-CM

## 2024-12-12 DIAGNOSIS — D68.51 THROMBOPHILIA ASSOCIATED WITH DOUBLE HETEROZYGOSITY FOR PROTHROMBIN GENE MUTATION AND FACTOR V LEIDEN MUTATION (MULTI): ICD-10-CM

## 2024-12-12 LAB — POC INR: 3.2 (ref 0.9–1.1)

## 2024-12-12 PROCEDURE — 1123F ACP DISCUSS/DSCN MKR DOCD: CPT | Performed by: INTERNAL MEDICINE

## 2024-12-12 PROCEDURE — 1160F RVW MEDS BY RX/DR IN RCRD: CPT | Performed by: INTERNAL MEDICINE

## 2024-12-12 PROCEDURE — 85610 PROTHROMBIN TIME: CPT | Performed by: INTERNAL MEDICINE

## 2024-12-12 PROCEDURE — 1159F MED LIST DOCD IN RCRD: CPT | Performed by: INTERNAL MEDICINE

## 2024-12-12 PROCEDURE — 99212 OFFICE O/P EST SF 10 MIN: CPT | Performed by: INTERNAL MEDICINE

## 2024-12-12 PROCEDURE — 1036F TOBACCO NON-USER: CPT | Performed by: INTERNAL MEDICINE

## 2024-12-12 PROCEDURE — G2211 COMPLEX E/M VISIT ADD ON: HCPCS | Performed by: INTERNAL MEDICINE

## 2024-12-12 NOTE — PROGRESS NOTES
Subjective   Patient ID: Michi Mckinley is a 69 y.o. male who presents for INR (INR 3.2/Dose 4mg daily ).    HPI     Review of Systems    Objective   There were no vitals taken for this visit.    Physical Exam    Assessment/Plan

## 2024-12-12 NOTE — PROGRESS NOTES
Subjective   Michi Mckinley is a 69 y.o. male here for follow-up of chronic anticoagulation for Atrial Fibrillation/Atrial Flutter.  Bleeding signs/symptoms:     Major bleeding event:    Thrombosis signs/symptoms:    Thromboembolic event:      Missed doses:     Medication changes:     Dietary changes:    Bacterial/viral infection: no  Other concerns:      ROS    Objective   Current warfarin (COUMADIN) dose: 4mg daily  Lab Results   Component Value Date    INR 3.2 (A) 12/12/2024    INR 3.3 (A) 11/11/2024    INR 2.6 (A) 10/09/2024       Assessment/Plan    Therapeutic INR for goal of The patient does not have an active anticoagulation episode.    Dose: no change  Next INR: 1 month

## 2024-12-17 ENCOUNTER — APPOINTMENT (OUTPATIENT)
Dept: SURGERY | Facility: CLINIC | Age: 69
End: 2024-12-17
Payer: MEDICARE

## 2025-01-10 DIAGNOSIS — Z79.01 CHRONIC ANTICOAGULATION: ICD-10-CM

## 2025-01-10 DIAGNOSIS — D68.51 THROMBOPHILIA ASSOCIATED WITH DOUBLE HETEROZYGOSITY FOR PROTHROMBIN GENE MUTATION AND FACTOR V LEIDEN MUTATION (MULTI): ICD-10-CM

## 2025-01-13 ENCOUNTER — APPOINTMENT (OUTPATIENT)
Dept: PRIMARY CARE | Facility: CLINIC | Age: 70
End: 2025-01-13
Payer: MEDICARE

## 2025-01-13 DIAGNOSIS — D68.51 THROMBOPHILIA ASSOCIATED WITH DOUBLE HETEROZYGOSITY FOR PROTHROMBIN GENE MUTATION AND FACTOR V LEIDEN MUTATION (MULTI): ICD-10-CM

## 2025-01-13 DIAGNOSIS — Z93.2 ILEOSTOMY PRESENT (MULTI): ICD-10-CM

## 2025-01-13 DIAGNOSIS — Z79.01 CHRONIC ANTICOAGULATION: Primary | ICD-10-CM

## 2025-01-13 LAB — POC INR: 2.9 (ref 0.9–1.1)

## 2025-01-13 PROCEDURE — 1036F TOBACCO NON-USER: CPT | Performed by: INTERNAL MEDICINE

## 2025-01-13 PROCEDURE — 1123F ACP DISCUSS/DSCN MKR DOCD: CPT | Performed by: INTERNAL MEDICINE

## 2025-01-13 PROCEDURE — 1159F MED LIST DOCD IN RCRD: CPT | Performed by: INTERNAL MEDICINE

## 2025-01-13 PROCEDURE — 1160F RVW MEDS BY RX/DR IN RCRD: CPT | Performed by: INTERNAL MEDICINE

## 2025-01-13 PROCEDURE — 85610 PROTHROMBIN TIME: CPT | Performed by: INTERNAL MEDICINE

## 2025-01-13 PROCEDURE — 99212 OFFICE O/P EST SF 10 MIN: CPT | Performed by: INTERNAL MEDICINE

## 2025-01-13 NOTE — PROGRESS NOTES
Subjective   Patient ID: Michi Mckinley is a 69 y.o. male who presents for INR (INR 2.9/Dose 4mg daily ).    HPI     Review of Systems    Objective   There were no vitals taken for this visit.    Physical Exam    Assessment/Plan

## 2025-01-13 NOTE — PROGRESS NOTES
Subjective   Michi Mckinley is a 69 y.o. male here for follow-up of chronic anticoagulation for Hyper Coaguable State.  Bleeding signs/symptoms:     Major bleeding event:    Thrombosis signs/symptoms:    Thromboembolic event:      Missed doses:     Medication changes:     Dietary changes:    Bacterial/viral infection: no  Other concerns:      Review of Systems   All other systems reviewed and are negative.      Objective   Current warfarin (COUMADIN) dose: 4mg daily   Lab Results   Component Value Date    INR 2.9 (A) 01/13/2025    INR 3.2 (A) 12/12/2024    INR 3.3 (A) 11/11/2024       Assessment/Plan    Therapeutic INR for goal of The patient does not have an active anticoagulation episode.    Dose: no change  Next INR: 1 month

## 2025-01-24 DIAGNOSIS — G25.81 RESTLESS LEGS SYNDROME: ICD-10-CM

## 2025-01-24 RX ORDER — PRAMIPEXOLE DIHYDROCHLORIDE 0.25 MG/1
0.25 TABLET ORAL NIGHTLY
Qty: 90 TABLET | Refills: 3 | Status: SHIPPED | OUTPATIENT
Start: 2025-01-24

## 2025-02-13 ENCOUNTER — APPOINTMENT (OUTPATIENT)
Dept: PRIMARY CARE | Facility: CLINIC | Age: 70
End: 2025-02-13
Payer: MEDICARE

## 2025-02-13 VITALS — DIASTOLIC BLOOD PRESSURE: 80 MMHG | SYSTOLIC BLOOD PRESSURE: 140 MMHG

## 2025-02-13 DIAGNOSIS — D68.51 THROMBOPHILIA ASSOCIATED WITH DOUBLE HETEROZYGOSITY FOR PROTHROMBIN GENE MUTATION AND FACTOR V LEIDEN MUTATION (MULTI): ICD-10-CM

## 2025-02-13 DIAGNOSIS — Z79.01 CHRONIC ANTICOAGULATION: Primary | ICD-10-CM

## 2025-02-13 LAB — POC INR: 2.8 (ref 0.9–1.1)

## 2025-02-13 PROCEDURE — 1123F ACP DISCUSS/DSCN MKR DOCD: CPT | Performed by: INTERNAL MEDICINE

## 2025-02-13 PROCEDURE — 99212 OFFICE O/P EST SF 10 MIN: CPT | Performed by: INTERNAL MEDICINE

## 2025-02-13 PROCEDURE — 85610 PROTHROMBIN TIME: CPT | Performed by: INTERNAL MEDICINE

## 2025-02-13 NOTE — PROGRESS NOTES
Subjective   Michi Mckinley is a 70 y.o. male here for follow-up of chronic anticoagulation for Deep Vein Thrombosis (DVT) and Hyper Coaguable State.  Bleeding signs/symptoms:     Major bleeding event:    Thrombosis signs/symptoms:    Thromboembolic event:      Missed doses:     Medication changes:     Dietary changes:    Bacterial/viral infection: no  Other concerns:      ROS    Objective   Current warfarin (COUMADIN) dose: 4m daily  y      Assessment/Plan    Therapeutic INR for goal of The patient does not have an active anticoagulation episode.    Dose: no change  4mg daily  Next INR: 1 month

## 2025-02-13 NOTE — PROGRESS NOTES
Subjective   Patient ID: Michi Mckinley is a 70 y.o. male who presents for INR (INR 2.8/Dose 4mg daily ).    HPI     Review of Systems    Objective   /88     Physical Exam    Assessment/Plan

## 2025-03-14 ENCOUNTER — TELEPHONE (OUTPATIENT)
Dept: PRIMARY CARE | Facility: CLINIC | Age: 70
End: 2025-03-14

## 2025-03-14 ENCOUNTER — APPOINTMENT (OUTPATIENT)
Dept: PRIMARY CARE | Facility: CLINIC | Age: 70
End: 2025-03-14
Payer: MEDICARE

## 2025-03-14 NOTE — TELEPHONE ENCOUNTER
Patient notified   Lmovm, informed patient to give our office a call back to reschedule follow up appt.

## 2025-03-17 ENCOUNTER — APPOINTMENT (OUTPATIENT)
Dept: PRIMARY CARE | Facility: CLINIC | Age: 70
End: 2025-03-17
Payer: MEDICARE

## 2025-03-17 VITALS
DIASTOLIC BLOOD PRESSURE: 78 MMHG | WEIGHT: 215 LBS | HEART RATE: 74 BPM | SYSTOLIC BLOOD PRESSURE: 120 MMHG | BODY MASS INDEX: 30.78 KG/M2 | HEIGHT: 70 IN

## 2025-03-17 DIAGNOSIS — Z79.01 CHRONIC ANTICOAGULATION: Primary | ICD-10-CM

## 2025-03-17 DIAGNOSIS — D68.51 THROMBOPHILIA ASSOCIATED WITH DOUBLE HETEROZYGOSITY FOR PROTHROMBIN GENE MUTATION AND FACTOR V LEIDEN MUTATION (MULTI): ICD-10-CM

## 2025-03-17 LAB — POC INR: 2.7 (ref 0.9–1.1)

## 2025-03-17 PROCEDURE — 1160F RVW MEDS BY RX/DR IN RCRD: CPT | Performed by: INTERNAL MEDICINE

## 2025-03-17 PROCEDURE — 1123F ACP DISCUSS/DSCN MKR DOCD: CPT | Performed by: INTERNAL MEDICINE

## 2025-03-17 PROCEDURE — 85610 PROTHROMBIN TIME: CPT | Performed by: INTERNAL MEDICINE

## 2025-03-17 PROCEDURE — 1036F TOBACCO NON-USER: CPT | Performed by: INTERNAL MEDICINE

## 2025-03-17 PROCEDURE — 1159F MED LIST DOCD IN RCRD: CPT | Performed by: INTERNAL MEDICINE

## 2025-03-17 PROCEDURE — 3008F BODY MASS INDEX DOCD: CPT | Performed by: INTERNAL MEDICINE

## 2025-03-17 PROCEDURE — 99212 OFFICE O/P EST SF 10 MIN: CPT | Performed by: INTERNAL MEDICINE

## 2025-03-17 ASSESSMENT — ENCOUNTER SYMPTOMS
BLURRED VISION: 1
RIGHT EYE: 1
LEFT EYE: 1
PHOTOPHOBIA: 1
VISUAL HALOS: 1

## 2025-03-17 NOTE — PROGRESS NOTES
Subjective   Michi Mckinley is a 70 y.o. male here for follow-up of chronic anticoagulation for Hyper Coaguable State.  Bleeding signs/symptoms:     Major bleeding event:    Thrombosis signs/symptoms:    Thromboembolic event:      Missed doses:     Medication changes:     Dietary changes:    Bacterial/viral infection: no  Other concerns:      Review of Systems   Eyes:  Positive for blurred vision, photophobia, vision loss in left eye, vision loss in right eye, visual disturbance and visual halos.   Musculoskeletal:  Positive for arthritis.       Objective   Current warfarin (COUMADIN) dose: 4mg daily   Lab Results   Component Value Date    INR 2.7 (A) 03/17/2025    INR 2.8 (A) 02/13/2025    INR 2.9 (A) 01/13/2025       Assessment/Plan    Therapeutic INR for goal of The patient does not have an active anticoagulation episode.    Dose: no change cont 4mg daily  Next INR: 1 month

## 2025-03-17 NOTE — PROGRESS NOTES
"Subjective   Patient ID: Michi Mckinley is a 70 y.o. male who presents for INR (INR 2.7/Dose 4mg daily).    HPI     Review of Systems    Objective   /78   Pulse 74   Ht 1.778 m (5' 10\")   Wt 97.5 kg (215 lb)   BMI 30.85 kg/m²     Physical Exam    Assessment/Plan          "

## 2025-04-17 ENCOUNTER — APPOINTMENT (OUTPATIENT)
Dept: PRIMARY CARE | Facility: CLINIC | Age: 70
End: 2025-04-17
Payer: MEDICARE

## 2025-04-17 VITALS
HEART RATE: 72 BPM | SYSTOLIC BLOOD PRESSURE: 178 MMHG | WEIGHT: 215 LBS | BODY MASS INDEX: 30.78 KG/M2 | DIASTOLIC BLOOD PRESSURE: 94 MMHG | HEIGHT: 70 IN

## 2025-04-17 DIAGNOSIS — Z79.01 CHRONIC ANTICOAGULATION: Primary | ICD-10-CM

## 2025-04-17 DIAGNOSIS — D68.51 THROMBOPHILIA ASSOCIATED WITH DOUBLE HETEROZYGOSITY FOR PROTHROMBIN GENE MUTATION AND FACTOR V LEIDEN MUTATION: ICD-10-CM

## 2025-04-17 DIAGNOSIS — M54.31 RIGHT SIDED SCIATICA: ICD-10-CM

## 2025-04-17 LAB — POC INR: 2.9 (ref 0.9–1.1)

## 2025-04-17 PROCEDURE — 3008F BODY MASS INDEX DOCD: CPT | Performed by: INTERNAL MEDICINE

## 2025-04-17 PROCEDURE — 1159F MED LIST DOCD IN RCRD: CPT | Performed by: INTERNAL MEDICINE

## 2025-04-17 PROCEDURE — 1160F RVW MEDS BY RX/DR IN RCRD: CPT | Performed by: INTERNAL MEDICINE

## 2025-04-17 PROCEDURE — 85610 PROTHROMBIN TIME: CPT | Performed by: INTERNAL MEDICINE

## 2025-04-17 PROCEDURE — G2211 COMPLEX E/M VISIT ADD ON: HCPCS | Performed by: INTERNAL MEDICINE

## 2025-04-17 PROCEDURE — 99213 OFFICE O/P EST LOW 20 MIN: CPT | Performed by: INTERNAL MEDICINE

## 2025-04-17 PROCEDURE — 1123F ACP DISCUSS/DSCN MKR DOCD: CPT | Performed by: INTERNAL MEDICINE

## 2025-04-17 PROCEDURE — 1036F TOBACCO NON-USER: CPT | Performed by: INTERNAL MEDICINE

## 2025-04-17 RX ORDER — METHYLPREDNISOLONE 4 MG/1
TABLET ORAL
Qty: 21 TABLET | Refills: 0 | Status: SHIPPED | OUTPATIENT
Start: 2025-04-17 | End: 2025-04-23

## 2025-04-17 RX ORDER — TRAMADOL HYDROCHLORIDE 50 MG/1
50 TABLET ORAL EVERY 8 HOURS PRN
Qty: 21 TABLET | Refills: 0 | Status: SHIPPED | OUTPATIENT
Start: 2025-04-17 | End: 2025-04-24

## 2025-04-17 NOTE — PROGRESS NOTES
Subjective   Michi Mckinley is a 70 y.o. male here for follow-up of chronic anticoagulation for Hyper Coaguable State.  Bleeding signs/symptoms:     Major bleeding event:    Thrombosis signs/symptoms:    Thromboembolic event:      Missed doses:     Medication changes:     Dietary changes:    Bacterial/viral infection: no  Other concerns:      Review of Systems   All other systems reviewed and are negative.      Objective   Current warfarin (COUMADIN) dose: 4 mg daily  Lab Results   Component Value Date    INR 2.9 (A) 04/17/2025    INR 2.7 (A) 03/17/2025    INR 2.8 (A) 02/13/2025       Assessment/Plan    Therapeutic INR for goal of The patient does not have an active anticoagulation episode.  Michi was seen today for inr and sciatica.  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  -     traMADol (Ultram) 50 mg tablet; Take 1 tablet (50 mg) by mouth every 8 hours if needed for severe pain (7 - 10) for up to 7 days.  Acute for psst 2 weeks . Will prescribe medrol dose pack as directed and consider gabapentin or short term tramadol   Dose: no change  Next INR: 1 month

## 2025-04-17 NOTE — PROGRESS NOTES
"Subjective   Patient ID: Michi Mckinley is a 70 y.o. male who presents for INR (INR 2.9/Dose 4mg sciatica ) and Sciatica (Going on for a few weeks).    HPI     Review of Systems    Objective   BP (!) 178/94   Pulse 72   Ht 1.778 m (5' 10\")   Wt 97.5 kg (215 lb)   BMI 30.85 kg/m²     Physical Exam    Assessment/Plan          "

## 2025-05-19 ENCOUNTER — APPOINTMENT (OUTPATIENT)
Dept: PRIMARY CARE | Facility: CLINIC | Age: 70
End: 2025-05-19
Payer: MEDICARE

## 2025-05-19 VITALS
HEIGHT: 70 IN | WEIGHT: 215 LBS | SYSTOLIC BLOOD PRESSURE: 144 MMHG | BODY MASS INDEX: 30.78 KG/M2 | DIASTOLIC BLOOD PRESSURE: 90 MMHG

## 2025-05-19 DIAGNOSIS — M15.9 GENERALIZED OSTEOARTHRITIS OF MULTIPLE SITES: ICD-10-CM

## 2025-05-19 DIAGNOSIS — D68.51 THROMBOPHILIA ASSOCIATED WITH DOUBLE HETEROZYGOSITY FOR PROTHROMBIN GENE MUTATION AND FACTOR V LEIDEN MUTATION: ICD-10-CM

## 2025-05-19 DIAGNOSIS — Z79.01 CHRONIC ANTICOAGULATION: Primary | ICD-10-CM

## 2025-05-19 DIAGNOSIS — I10 PRIMARY HYPERTENSION: ICD-10-CM

## 2025-05-19 DIAGNOSIS — J41.1 MUCOPURULENT CHRONIC BRONCHITIS (MULTI): ICD-10-CM

## 2025-05-19 LAB — POC INR: 2.9 (ref 0.9–1.1)

## 2025-05-19 PROCEDURE — 3080F DIAST BP >= 90 MM HG: CPT | Performed by: INTERNAL MEDICINE

## 2025-05-19 PROCEDURE — 1159F MED LIST DOCD IN RCRD: CPT | Performed by: INTERNAL MEDICINE

## 2025-05-19 PROCEDURE — 85610 PROTHROMBIN TIME: CPT | Performed by: INTERNAL MEDICINE

## 2025-05-19 PROCEDURE — 1036F TOBACCO NON-USER: CPT | Performed by: INTERNAL MEDICINE

## 2025-05-19 PROCEDURE — G2211 COMPLEX E/M VISIT ADD ON: HCPCS | Performed by: INTERNAL MEDICINE

## 2025-05-19 PROCEDURE — 3008F BODY MASS INDEX DOCD: CPT | Performed by: INTERNAL MEDICINE

## 2025-05-19 PROCEDURE — 3077F SYST BP >= 140 MM HG: CPT | Performed by: INTERNAL MEDICINE

## 2025-05-19 PROCEDURE — 99213 OFFICE O/P EST LOW 20 MIN: CPT | Performed by: INTERNAL MEDICINE

## 2025-05-19 PROCEDURE — 1160F RVW MEDS BY RX/DR IN RCRD: CPT | Performed by: INTERNAL MEDICINE

## 2025-05-19 RX ORDER — FLUTICASONE FUROATE AND VILANTEROL 200; 25 UG/1; UG/1
1 POWDER RESPIRATORY (INHALATION) DAILY
Qty: 1 EACH | Refills: 3 | Status: SHIPPED | OUTPATIENT
Start: 2025-05-19 | End: 2026-05-19

## 2025-05-19 RX ORDER — TIZANIDINE 4 MG/1
4 TABLET ORAL 3 TIMES DAILY PRN
Qty: 270 TABLET | Refills: 3 | Status: SHIPPED | OUTPATIENT
Start: 2025-05-19 | End: 2026-05-19

## 2025-05-19 RX ORDER — LOSARTAN POTASSIUM 100 MG/1
100 TABLET ORAL DAILY
Qty: 100 TABLET | Refills: 3 | Status: SHIPPED | OUTPATIENT
Start: 2025-05-19 | End: 2026-06-23

## 2025-05-19 NOTE — PROGRESS NOTES
"Subjective   Patient ID: Michi Mckinley is a 70 y.o. male who presents for INR (INR 2.9/Dose 4mg).    HPI     Review of Systems    Objective   /90   Ht 1.778 m (5' 10\")   Wt 97.5 kg (215 lb)   BMI 30.85 kg/m²     Physical Exam    Assessment/Plan          "

## 2025-05-19 NOTE — PROGRESS NOTES
"Subjective   Reason for Visit: Michi Mckinley is an 70 y.o. male here for a  fu  visit.     Past Medical, Surgical, and Family History reviewed and updated in chart.         HPI    Patient Care Team:  Roly Nance DO as PCP - General  Roly Nance DO as PCP - [facility] Attributed Provider     Review of Systems   All other systems reviewed and are negative.      Objective   Vitals:  /90   Ht 1.778 m (5' 10\")   Wt 97.5 kg (215 lb)   BMI 30.85 kg/m²       Physical Exam  Vitals and nursing note reviewed.   Constitutional:       General: He is not in acute distress.     Appearance: Normal appearance. He is well-developed. He is obese. He is not toxic-appearing.   HENT:      Head: Normocephalic and atraumatic.      Right Ear: Tympanic membrane and external ear normal.      Left Ear: Tympanic membrane and external ear normal.      Nose: Nose normal.      Mouth/Throat:      Mouth: Mucous membranes are moist.      Pharynx: Oropharynx is clear. No oropharyngeal exudate or posterior oropharyngeal erythema.      Tonsils: No tonsillar exudate. 2+ on the right. 2+ on the left.   Eyes:      Extraocular Movements: Extraocular movements intact.      Conjunctiva/sclera: Conjunctivae normal.   Cardiovascular:      Rate and Rhythm: Normal rate and regular rhythm.      Pulses: Normal pulses.      Heart sounds: Normal heart sounds. No murmur heard.  Pulmonary:      Effort: Pulmonary effort is normal.      Breath sounds: Normal breath sounds.   Abdominal:      General: Abdomen is flat. Bowel sounds are normal.      Palpations: Abdomen is soft.   Musculoskeletal:      Cervical back: Neck supple.   Feet:      Right foot:      Skin integrity: Skin integrity normal. No ulcer, blister, skin breakdown, erythema, warmth or callus.      Toenail Condition: Right toenails are normal.      Left foot:      Skin integrity: Skin integrity normal. No ulcer, blister, skin breakdown, erythema, warmth or callus.      Toenail " Condition: Left toenails are normal.   Lymphadenopathy:      Cervical: No cervical adenopathy.   Skin:     General: Skin is warm and dry.      Capillary Refill: Capillary refill takes more than 3 seconds.      Findings: No rash.   Neurological:      Mental Status: He is alert. Mental status is at baseline.      Sensory: Sensation is intact.   Psychiatric:         Mood and Affect: Mood normal.         Behavior: Behavior normal.         Thought Content: Thought content normal.         Judgment: Judgment normal.         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    Chronic anticoagulation  Therapeutic INR 2.9 on 4 mg of warfarin reevaluate 1 month  Orders:    POCT INR manually resulted; Future    Follow Up In [facility] - PCP - Established    losartan (Cozaar) 100 mg tablet; Take 1 tablet (100 mg) by mouth once daily.    Generalized osteoarthritis of multiple sites  Refill tizanidine 4 mg 3 times a day as needed for muscle spasms related to severe osteoarthritis  Orders:    tiZANidine (Zanaflex) 4 mg tablet; Take 1 tablet (4 mg) by mouth 3 times a day as needed for muscle spasms.    Mucopurulent chronic bronchitis (Multi)  Patient with worsening sputum changes and mild wheeze with bronchitis upper airway will start inhaled medication therapy with inhaled steroid long-acting beta agonist with Breo Ellipta 200/25 1 elation daily in addition to albuterol reevaluate in 1 month  Orders:    fluticasone furoate-vilanteroL (Breo Ellipta) 200-25 mcg/dose inhaler; Inhale 1 puff once daily.    Primary hypertension  Elevated blood pressure start losartan 100 mg daily and reevaluate with next visit  Orders:     losartan (Cozaar) 100 mg tablet; Take 1 tablet (100 mg) by mouth once daily.

## 2025-05-19 NOTE — ASSESSMENT & PLAN NOTE
Therapeutic INR of 2.9 on 4 mg of warfarin continue chronic anticoagulation reevaluate 1 month  Orders:    POCT INR manually resulted; Future    losartan (Cozaar) 100 mg tablet; Take 1 tablet (100 mg) by mouth once daily.

## 2025-05-19 NOTE — ASSESSMENT & PLAN NOTE
Therapeutic INR 2.9 on 4 mg of warfarin reevaluate 1 month  Orders:    POCT INR manually resulted; Future    Follow Up In [facility] - PCP - Established    losartan (Cozaar) 100 mg tablet; Take 1 tablet (100 mg) by mouth once daily.

## 2025-05-19 NOTE — ASSESSMENT & PLAN NOTE
Refill tizanidine 4 mg 3 times a day as needed for muscle spasms related to severe osteoarthritis  Orders:    tiZANidine (Zanaflex) 4 mg tablet; Take 1 tablet (4 mg) by mouth 3 times a day as needed for muscle spasms.

## 2025-05-21 NOTE — ASSESSMENT & PLAN NOTE
Elevated blood pressure start losartan 100 mg daily and reevaluate with next visit  Orders:    losartan (Cozaar) 100 mg tablet; Take 1 tablet (100 mg) by mouth once daily.

## 2025-06-20 ENCOUNTER — APPOINTMENT (OUTPATIENT)
Dept: PRIMARY CARE | Facility: CLINIC | Age: 70
End: 2025-06-20
Payer: MEDICARE

## 2025-06-20 VITALS — SYSTOLIC BLOOD PRESSURE: 150 MMHG | DIASTOLIC BLOOD PRESSURE: 82 MMHG

## 2025-06-20 DIAGNOSIS — I10 PRIMARY HYPERTENSION: ICD-10-CM

## 2025-06-20 DIAGNOSIS — E78.5 DYSLIPIDEMIA, GOAL LDL BELOW 100: ICD-10-CM

## 2025-06-20 DIAGNOSIS — R73.02 IGT (IMPAIRED GLUCOSE TOLERANCE): ICD-10-CM

## 2025-06-20 DIAGNOSIS — Z79.01 CHRONIC ANTICOAGULATION: Primary | ICD-10-CM

## 2025-06-20 DIAGNOSIS — D68.51 THROMBOPHILIA ASSOCIATED WITH DOUBLE HETEROZYGOSITY FOR PROTHROMBIN GENE MUTATION AND FACTOR V LEIDEN MUTATION: ICD-10-CM

## 2025-06-20 LAB — POC INR: 2.5 (ref 0.9–1.1)

## 2025-06-20 PROCEDURE — G2211 COMPLEX E/M VISIT ADD ON: HCPCS | Performed by: INTERNAL MEDICINE

## 2025-06-20 PROCEDURE — 99213 OFFICE O/P EST LOW 20 MIN: CPT | Performed by: INTERNAL MEDICINE

## 2025-06-20 PROCEDURE — 3079F DIAST BP 80-89 MM HG: CPT | Performed by: INTERNAL MEDICINE

## 2025-06-20 PROCEDURE — 3077F SYST BP >= 140 MM HG: CPT | Performed by: INTERNAL MEDICINE

## 2025-06-20 PROCEDURE — 1160F RVW MEDS BY RX/DR IN RCRD: CPT | Performed by: INTERNAL MEDICINE

## 2025-06-20 PROCEDURE — 1159F MED LIST DOCD IN RCRD: CPT | Performed by: INTERNAL MEDICINE

## 2025-06-20 PROCEDURE — 85610 PROTHROMBIN TIME: CPT | Performed by: INTERNAL MEDICINE

## 2025-06-20 RX ORDER — AMLODIPINE BESYLATE 5 MG/1
5 TABLET ORAL DAILY
Qty: 90 TABLET | Refills: 3 | Status: SHIPPED | OUTPATIENT
Start: 2025-06-20 | End: 2026-06-20

## 2025-06-20 NOTE — PROGRESS NOTES
Subjective   Patient ID: Michi Mckinley is a 70 y.o. male who presents for Coagulation Disorder ( INR: 2.5 Current Dose: 4 mg of warfarin continue).    HPI     Review of Systems    Objective   /82     Physical Exam    Assessment/Plan

## 2025-06-20 NOTE — PROGRESS NOTES
Subjective   Michi Mckinley is a 70 y.o. male here for follow-up of chronic anticoagulation for Hyper Coaguable State.  Bleeding signs/symptoms:     Major bleeding event:    Thrombosis signs/symptoms:    Thromboembolic event:      Missed doses:     Medication changes:     Dietary changes:    Bacterial/viral infection: no  Other concerns:      Review of Systems   All other systems reviewed and are negative.      Objective   Current warfarin (COUMADIN) dose: 4 mg daily  Lab Results   Component Value Date    INR 2.9 (A) 05/19/2025    INR 2.9 (A) 04/17/2025    INR 2.7 (A) 03/17/2025       Assessment/Plan    #1 anticoagulation hypercoagulable state therapeutic INR at 4 mg daily reevaluate 1 month  #2 hypertension recently increased losartan 100 mg daily with improvement blood pressure still elevated at 150/82 will add amlodipine 5 mg daily and reevaluate in 1 month obtain fasting lab work to evaluate for endorgan disease      Dose: no change  Next INR: 1 month

## 2025-06-22 ENCOUNTER — APPOINTMENT (OUTPATIENT)
Dept: RADIOLOGY | Facility: HOSPITAL | Age: 70
End: 2025-06-22
Payer: MEDICARE

## 2025-06-22 ENCOUNTER — HOSPITAL ENCOUNTER (EMERGENCY)
Facility: HOSPITAL | Age: 70
Discharge: HOME | End: 2025-06-22
Attending: EMERGENCY MEDICINE
Payer: MEDICARE

## 2025-06-22 VITALS
HEIGHT: 69 IN | SYSTOLIC BLOOD PRESSURE: 122 MMHG | OXYGEN SATURATION: 98 % | TEMPERATURE: 98.6 F | HEART RATE: 78 BPM | WEIGHT: 206 LBS | RESPIRATION RATE: 20 BRPM | BODY MASS INDEX: 30.51 KG/M2 | DIASTOLIC BLOOD PRESSURE: 80 MMHG

## 2025-06-22 DIAGNOSIS — S00.81XA ABRASION OF FOREHEAD, INITIAL ENCOUNTER: ICD-10-CM

## 2025-06-22 DIAGNOSIS — S40.011A CONTUSION OF RIGHT SHOULDER, INITIAL ENCOUNTER: ICD-10-CM

## 2025-06-22 DIAGNOSIS — S09.90XA INJURY OF HEAD, INITIAL ENCOUNTER: Primary | ICD-10-CM

## 2025-06-22 DIAGNOSIS — D68.51 THROMBOPHILIA ASSOCIATED WITH DOUBLE HETEROZYGOSITY FOR PROTHROMBIN GENE MUTATION AND FACTOR V LEIDEN MUTATION: ICD-10-CM

## 2025-06-22 LAB
ERYTHROCYTE [DISTWIDTH] IN BLOOD BY AUTOMATED COUNT: 13 % (ref 11.5–14.5)
HCT VFR BLD AUTO: 50.8 % (ref 41–52)
HGB BLD-MCNC: 18 G/DL (ref 13.5–17.5)
INR PPP: 2.3 (ref 0.9–1.1)
MCH RBC QN AUTO: 33.8 PG (ref 26–34)
MCHC RBC AUTO-ENTMCNC: 35.4 G/DL (ref 32–36)
MCV RBC AUTO: 95 FL (ref 80–100)
NRBC BLD-RTO: 0 /100 WBCS (ref 0–0)
PLATELET # BLD AUTO: 230 X10*3/UL (ref 150–450)
PROTHROMBIN TIME: 25.8 SECONDS (ref 9.8–12.4)
RBC # BLD AUTO: 5.33 X10*6/UL (ref 4.5–5.9)
WBC # BLD AUTO: 15.5 X10*3/UL (ref 4.4–11.3)

## 2025-06-22 PROCEDURE — G0390 TRAUMA RESPONS W/HOSP CRITI: HCPCS

## 2025-06-22 PROCEDURE — 2500000001 HC RX 250 WO HCPCS SELF ADMINISTERED DRUGS (ALT 637 FOR MEDICARE OP): Performed by: EMERGENCY MEDICINE

## 2025-06-22 PROCEDURE — 70450 CT HEAD/BRAIN W/O DYE: CPT

## 2025-06-22 PROCEDURE — 85610 PROTHROMBIN TIME: CPT | Performed by: EMERGENCY MEDICINE

## 2025-06-22 PROCEDURE — 73030 X-RAY EXAM OF SHOULDER: CPT | Mod: RIGHT SIDE | Performed by: RADIOLOGY

## 2025-06-22 PROCEDURE — 73030 X-RAY EXAM OF SHOULDER: CPT | Mod: RT

## 2025-06-22 PROCEDURE — 85027 COMPLETE CBC AUTOMATED: CPT | Performed by: EMERGENCY MEDICINE

## 2025-06-22 PROCEDURE — 36415 COLL VENOUS BLD VENIPUNCTURE: CPT | Performed by: EMERGENCY MEDICINE

## 2025-06-22 PROCEDURE — 71045 X-RAY EXAM CHEST 1 VIEW: CPT | Performed by: RADIOLOGY

## 2025-06-22 PROCEDURE — 99285 EMERGENCY DEPT VISIT HI MDM: CPT | Mod: 25 | Performed by: EMERGENCY MEDICINE

## 2025-06-22 PROCEDURE — 72125 CT NECK SPINE W/O DYE: CPT

## 2025-06-22 PROCEDURE — 73060 X-RAY EXAM OF HUMERUS: CPT | Mod: RIGHT SIDE | Performed by: RADIOLOGY

## 2025-06-22 PROCEDURE — 73060 X-RAY EXAM OF HUMERUS: CPT | Mod: RT

## 2025-06-22 PROCEDURE — 70450 CT HEAD/BRAIN W/O DYE: CPT | Performed by: RADIOLOGY

## 2025-06-22 PROCEDURE — 72125 CT NECK SPINE W/O DYE: CPT | Performed by: RADIOLOGY

## 2025-06-22 PROCEDURE — 71045 X-RAY EXAM CHEST 1 VIEW: CPT

## 2025-06-22 RX ORDER — OXYCODONE HYDROCHLORIDE 5 MG/1
5 TABLET ORAL ONCE
Refills: 0 | Status: COMPLETED | OUTPATIENT
Start: 2025-06-22 | End: 2025-06-22

## 2025-06-22 RX ADMIN — OXYCODONE HYDROCHLORIDE 5 MG: 5 TABLET ORAL at 13:05

## 2025-06-22 ASSESSMENT — PAIN - FUNCTIONAL ASSESSMENT
PAIN_FUNCTIONAL_ASSESSMENT: 0-10

## 2025-06-22 ASSESSMENT — PAIN DESCRIPTION - FREQUENCY: FREQUENCY: CONSTANT/CONTINUOUS

## 2025-06-22 ASSESSMENT — PAIN SCALES - GENERAL
PAINLEVEL_OUTOF10: 8
PAINLEVEL_OUTOF10: 8
PAINLEVEL_OUTOF10: 9
PAINLEVEL_OUTOF10: 8
PAINLEVEL_OUTOF10: 9
PAINLEVEL_OUTOF10: 7
PAINLEVEL_OUTOF10: 9

## 2025-06-22 ASSESSMENT — PAIN DESCRIPTION - DESCRIPTORS
DESCRIPTORS: ACHING
DESCRIPTORS: SHARP
DESCRIPTORS: ACHING
DESCRIPTORS: ACHING

## 2025-06-22 ASSESSMENT — LIFESTYLE VARIABLES
HAVE YOU EVER FELT YOU SHOULD CUT DOWN ON YOUR DRINKING: NO
EVER HAD A DRINK FIRST THING IN THE MORNING TO STEADY YOUR NERVES TO GET RID OF A HANGOVER: NO
EVER FELT BAD OR GUILTY ABOUT YOUR DRINKING: NO
TOTAL SCORE: 0
HAVE PEOPLE ANNOYED YOU BY CRITICIZING YOUR DRINKING: NO

## 2025-06-22 ASSESSMENT — PAIN DESCRIPTION - LOCATION: LOCATION: SHOULDER

## 2025-06-22 ASSESSMENT — PAIN DESCRIPTION - ORIENTATION: ORIENTATION: RIGHT

## 2025-06-22 ASSESSMENT — PAIN DESCRIPTION - ONSET: ONSET: SUDDEN

## 2025-06-22 NOTE — ED PROVIDER NOTES
Chief Complaint   Patient presents with   • Trauma     Fall this AM.  Head injury, R shoulder pain.       HPI       70 year old male presents to the Emergency Department today complaining of suffering a head injury just prior to arrival. Notes that he slipped on a wet metal step and fell directly on his right upper extremity and then hit his head on the ground. Denies any loss of consciousness or seizure-like activity. Denies any other injuries. Denies any associated fever, chills, headache, neck pain, chest pain, shortness of breath, abdominal pain, nausea, vomiting, diarrhea, constipation, or urinary symptoms. Last Tetanus shot was less than 5 years ago.       History provided by:  Patient             Patient History   Medical History[1]  Surgical History[2]  Family History[3]  Social History[4]        Physical Exam  Constitutional:       Appearance: Normal appearance.   HENT:      Head: Normocephalic.      Right Ear: External ear normal.      Left Ear: External ear normal.      Ears:      Comments: Bilateral auditory canals are non-inflamed and non-reddened. Bilateral TMs are pearly gray with good light reflex. No hemotympanum or kaporo signs noted.      Nose: Nose normal.      Comments: Septum midline without deviation. Turbinates are not inflamed and reddened. No septal hematoma noted.      Mouth/Throat:      Comments:  Mucous membranes are moist. All teeth are intact. Uvula midline without deviation rises upon phonation. Tonsils 1+ without exudate.   Eyes:      Comments: Bilateral conjunctiva are without injection, discharge, or drainage. PERRL with consensual pupil response bilaterally. EOM's are intact without any signs of entrapment. No hyphema or raccoon's eyes.   Cardiovascular:      Rate and Rhythm: Normal rate and regular rhythm.      Pulses:           Radial pulses are 3+ on the right side and 3+ on the left side.      Heart sounds: Normal heart sounds. No murmur heard.     No friction rub. No gallop.    Pulmonary:      Effort: Pulmonary effort is normal.      Breath sounds: Normal breath sounds. No wheezing, rhonchi or rales.   Abdominal:      General: Abdomen is flat. Bowel sounds are normal.      Palpations: Abdomen is soft.      Tenderness: There is no right CVA tenderness, left CVA tenderness, guarding or rebound. Negative signs include Reza's sign and McBurney's sign.   Musculoskeletal:      Cervical back: Full passive range of motion without pain, normal range of motion and neck supple.      Comments: No obvious deformity or ecchymosis noted to the right upper extremity, but there is edema and tenderness noted to the proximal humerus. Limited ROM. Right radial pulse is strong and regular. Capillary refill was within normal limits. Sensation is intact distally.    Lymphadenopathy:      Cervical: No cervical adenopathy.   Skin:     Comments: Abrasions to the right forehead. No rashes, lesions, petechiae, or purpura. No signs of infection.   Neurological:      Mental Status: He is alert and oriented to person, place, and time.      Comments: Alert and oriented to person, place, or time. Follows commands well. Hand grasps and push/pulls of upper and lower extremities are equally strong bilaterally. Gait is steady and strong.    Psychiatric:         Attention and Perception: Attention normal.         Mood and Affect: Mood normal.         Speech: Speech normal.         Labs Reviewed   PROTIME-INR - Abnormal       Result Value    Protime 25.8 (*)     INR 2.3 (*)    CBC - Abnormal    WBC 15.5 (*)     nRBC 0.0      RBC 5.33      Hemoglobin 18.0 (*)     Hematocrit 50.8      MCV 95      MCH 33.8      MCHC 35.4      RDW 13.0      Platelets 230         CT cervical spine wo IV contrast   Final Result   No evidence for an acute fracture or subluxation of the cervical   spine. Multilevel discogenic degenerative changes.        MACRO:   None        Signed by: Ann Marie Sparks 6/22/2025 12:54 PM   Dictation workstation:    [id number]      CT head wo IV contrast   Final Result   No evidence of acute cortical infarct or intracranial hemorrhage.        No evidence of intracranial hemorrhage or displaced skull fracture.        MACRO:   None        Signed by: Ann Marie Sparks 6/22/2025 12:51 PM   Dictation workstation:   [id number]      XR chest 1 view   Final Result   No acute cardiopulmonary process is evident.        MACRO:   None        Signed by: Asim Sidhu 6/22/2025 12:43 PM   Dictation workstation:   [id number]      XR shoulder right 2+ views   Final Result   No osseous injury is evident.        MACRO:   None        Signed by: Asim Sidhu 6/22/2025 12:43 PM   Dictation workstation:   [id number]      XR humerus right    (Results Pending)            ED Course & MDM   Diagnoses as of 06/22/25 1334   Injury of head, initial encounter   Abrasion of forehead, initial encounter   Contusion of right shoulder, initial encounter           Medical Decision Making  Patient was seen and evaluated by Dr. Gunn. Limited trauma/HIA was activated. Saline lock was established with labs drawn and results as above. Noted to have an elevated WBC count of 15.5 that is secondary to a stress reaction. PT/INR is therapeutic. Right shoulder, humerus, and chest x-rays show no osseous injury is evident with no acute cardiopulmonary process is evident. Instructed to ice and elevate the sore area as much as possible. Take Tylenol over the counter as needed for pain. Avoid NSAIDs secondary to being on Coumadin. Placed in a sling by staff. Capillary refill was within normal limits and sensation was intact distally post-application. Given sling instructions.  Follow up with their doctor in 3 days. Return if worse in any way. Discharged in stable condition with computer head injury instructions.    Diagnostic Impression:     1. Closed head injury without concussion    2. Abrasions    3. Right shoulder contusion           Your medication list        ASK your  doctor about these medications        Instructions Last Dose Given Next Dose Due   albuterol 90 mcg/actuation inhaler           amitriptyline 25 mg tablet  Commonly known as: Elavil      TAKE 1 TABLET BY MOUTH ONCE DAILY AT BEDTIME       amLODIPine 5 mg tablet  Commonly known as: Norvasc      Take 1 tablet (5 mg) by mouth once daily.       brimonidine 0.2 % ophthalmic solution  Commonly known as: AlphaGAN           cyanocobalamin (vitamin B-12) 1,000 mcg tablet extended release  Commonly known as: Vitamin B-12           dorzolamide 2 % ophthalmic solution  Commonly known as: Trusopt           fluticasone furoate-vilanteroL 200-25 mcg/dose inhaler  Commonly known as: Breo Ellipta      Inhale 1 puff once daily.       GENERIC EXTERNAL MEDICATION           latanoprost 0.005 % ophthalmic solution  Commonly known as: Xalatan           losartan 100 mg tablet  Commonly known as: Cozaar      Take 1 tablet (100 mg) by mouth once daily.       naloxone 4 mg/0.1 mL nasal spray  Commonly known as: Narcan           omeprazole 20 mg DR capsule  Commonly known as: PriLOSEC           pramipexole 0.25 mg tablet  Commonly known as: Mirapex      TAKE 1 TABLET BY MOUTH ONCE DAILY AT BEDTIME       timolol 0.25 % ophthalmic solution  Commonly known as: Timoptic           tiZANidine 4 mg tablet  Commonly known as: Zanaflex      Take 1 tablet (4 mg) by mouth 3 times a day as needed for muscle spasms.       warfarin 4 mg tablet  Commonly known as: Coumadin      TAKE 1 TABLET BY MOUTH ONCE DAILY AT BEDTIME                  Procedure  Procedures           [1]  Past Medical History:  Diagnosis Date   • Abnormal coagulation profile 07/08/2020    Supratherapeutic INR   • Benign neoplasm of colon, unspecified 01/13/2016    FAP (familial adenomatous polyposis)   • Benign prostatic hyperplasia without lower urinary tract symptoms     Benign prostate hyperplasia   • Encounter for general adult medical examination without abnormal findings 07/08/2020     Initial Medicare annual wellness visit   • Encounter for general adult medical examination without abnormal findings 07/08/2020    Medicare annual wellness visit, subsequent   • Enterostomy malfunction (Multi) 04/08/2016    Ileostomy dysfunction   • Hereditary and idiopathic neuropathy, unspecified 07/08/2020    Idiopathic peripheral neuropathy   • Long term (current) use of opiate analgesic 02/22/2022    Chronic prescription opiate use   • Long term (current) use of opiate analgesic 11/11/2020    Long term current use of opiate analgesic   • Long term (current) use of opiate analgesic 06/21/2022    Long term current use of opiate analgesic   • Other disturbances of smell and taste 10/14/2020    Metallic taste   • Other specified soft tissue disorders 01/13/2016    Left leg swelling   • Pain in left shoulder 04/04/2016    Chronic left shoulder pain   • Personal history of other diseases of the circulatory system 12/11/2019    History of essential hypertension   • Personal history of other diseases of the digestive system 11/11/2020    History of glossitis   • Personal history of other diseases of the musculoskeletal system and connective tissue 01/13/2016    History of degenerative disc disease   • Personal history of other diseases of the musculoskeletal system and connective tissue 01/13/2016    History of arthritis   • Personal history of other diseases of the nervous system and sense organs 01/13/2016    History of glaucoma   • Personal history of other diseases of the nervous system and sense organs     History of carpal tunnel syndrome   • Personal history of other venous thrombosis and embolism 01/13/2016    History of thromboembolism of vein   • Personal history of pulmonary embolism 01/13/2016    History of pulmonary embolism   • Primary osteoarthritis, right hand 12/11/2019    Primary osteoarthritis of both hands   [2]  Past Surgical History:  Procedure Laterality Date   • OTHER SURGICAL HISTORY   01/13/2016    Shoulder Surgery Left   • OTHER SURGICAL HISTORY  01/13/2016    Total Abdominal Colectomy   • OTHER SURGICAL HISTORY  12/11/2019    Carpal tunnel surgery   • SHOULDER SURGERY  01/13/2016    Shoulder Surgery Right   [3]  No family history on file.  [4]  Social History  Tobacco Use   • Smoking status: Former     Types: Cigarettes   • Smokeless tobacco: Never   Vaping Use   • Vaping status: Never Used   Substance Use Topics   • Alcohol use: Yes   • Drug use: Yes     Types: Marijuana     Comment: Medical Marijuana      DANIEL Don-MURPHY  06/22/25 0108

## 2025-06-23 RX ORDER — WARFARIN 4 MG/1
4 TABLET ORAL NIGHTLY
Qty: 30 TABLET | Refills: 0 | Status: SHIPPED | OUTPATIENT
Start: 2025-06-23

## 2025-06-24 ENCOUNTER — TELEPHONE (OUTPATIENT)
Dept: PRIMARY CARE | Facility: CLINIC | Age: 70
End: 2025-06-24
Payer: MEDICARE

## 2025-06-24 NOTE — TELEPHONE ENCOUNTER
Patient called took a fall on Saturday and went to the ER where he was given an xray and cat scan and was told to follow up no openings before patients 7/21 appointment unsure if you wanted to see patient sooner please advise

## 2025-06-25 ENCOUNTER — TELEPHONE (OUTPATIENT)
Dept: PRIMARY CARE | Facility: CLINIC | Age: 70
End: 2025-06-25
Payer: MEDICARE

## 2025-06-25 NOTE — TELEPHONE ENCOUNTER
He called and fell on Sunday and went to the ER and they did Ct scan & xray and his upper right arm is bruise looks almost black. He stated the ER wants him to follow up but he stated that he is coming on 7/21 for and INR apt Please advise and call him at  [phone]. They gave him 1 Oxycodone but him went and got some Aleve.

## 2025-07-10 LAB
ALBUMIN SERPL-MCNC: 4.6 G/DL (ref 3.6–5.1)
ALBUMIN/CREAT UR: 3 MG/G CREAT
ALP SERPL-CCNC: 66 U/L (ref 35–144)
ALT SERPL-CCNC: 27 U/L (ref 9–46)
ANION GAP SERPL CALCULATED.4IONS-SCNC: 7 MMOL/L (CALC) (ref 7–17)
AST SERPL-CCNC: 26 U/L (ref 10–35)
BILIRUB SERPL-MCNC: 0.6 MG/DL (ref 0.2–1.2)
BUN SERPL-MCNC: 18 MG/DL (ref 7–25)
CALCIUM SERPL-MCNC: 9.6 MG/DL (ref 8.6–10.3)
CHLORIDE SERPL-SCNC: 104 MMOL/L (ref 98–110)
CHOLEST SERPL-MCNC: 220 MG/DL
CHOLEST/HDLC SERPL: 5.4 (CALC)
CO2 SERPL-SCNC: 26 MMOL/L (ref 20–32)
CREAT SERPL-MCNC: 1.14 MG/DL (ref 0.7–1.28)
CREAT UR-MCNC: 119 MG/DL (ref 20–320)
EGFRCR SERPLBLD CKD-EPI 2021: 69 ML/MIN/1.73M2
EST. AVERAGE GLUCOSE BLD GHB EST-MCNC: 114 MG/DL
EST. AVERAGE GLUCOSE BLD GHB EST-SCNC: 6.3 MMOL/L
GLUCOSE SERPL-MCNC: 103 MG/DL (ref 65–99)
HBA1C MFR BLD: 5.6 %
HDLC SERPL-MCNC: 41 MG/DL
LDLC SERPL CALC-MCNC: 135 MG/DL (CALC)
MICROALBUMIN UR-MCNC: 0.4 MG/DL
NONHDLC SERPL-MCNC: 179 MG/DL (CALC)
POTASSIUM SERPL-SCNC: 4.7 MMOL/L (ref 3.5–5.3)
PROT SERPL-MCNC: 6.9 G/DL (ref 6.1–8.1)
SODIUM SERPL-SCNC: 137 MMOL/L (ref 135–146)
TRIGL SERPL-MCNC: 288 MG/DL

## 2025-07-20 DIAGNOSIS — D68.51 THROMBOPHILIA ASSOCIATED WITH DOUBLE HETEROZYGOSITY FOR PROTHROMBIN GENE MUTATION AND FACTOR V LEIDEN MUTATION: ICD-10-CM

## 2025-07-21 ENCOUNTER — APPOINTMENT (OUTPATIENT)
Dept: PRIMARY CARE | Facility: CLINIC | Age: 70
End: 2025-07-21
Payer: MEDICARE

## 2025-07-21 VITALS
HEIGHT: 69 IN | SYSTOLIC BLOOD PRESSURE: 120 MMHG | DIASTOLIC BLOOD PRESSURE: 74 MMHG | WEIGHT: 200 LBS | BODY MASS INDEX: 29.62 KG/M2 | HEART RATE: 74 BPM

## 2025-07-21 DIAGNOSIS — Z79.01 CHRONIC ANTICOAGULATION: ICD-10-CM

## 2025-07-21 DIAGNOSIS — D68.51 THROMBOPHILIA ASSOCIATED WITH DOUBLE HETEROZYGOSITY FOR PROTHROMBIN GENE MUTATION AND FACTOR V LEIDEN MUTATION: ICD-10-CM

## 2025-07-21 LAB — POC INR: 2.3 (ref 0.9–1.1)

## 2025-07-21 PROCEDURE — 3008F BODY MASS INDEX DOCD: CPT | Performed by: INTERNAL MEDICINE

## 2025-07-21 PROCEDURE — 1160F RVW MEDS BY RX/DR IN RCRD: CPT | Performed by: INTERNAL MEDICINE

## 2025-07-21 PROCEDURE — 1159F MED LIST DOCD IN RCRD: CPT | Performed by: INTERNAL MEDICINE

## 2025-07-21 PROCEDURE — 3074F SYST BP LT 130 MM HG: CPT | Performed by: INTERNAL MEDICINE

## 2025-07-21 PROCEDURE — 85610 PROTHROMBIN TIME: CPT | Performed by: INTERNAL MEDICINE

## 2025-07-21 PROCEDURE — 3078F DIAST BP <80 MM HG: CPT | Performed by: INTERNAL MEDICINE

## 2025-07-21 PROCEDURE — 99212 OFFICE O/P EST SF 10 MIN: CPT | Performed by: INTERNAL MEDICINE

## 2025-07-21 PROCEDURE — G2211 COMPLEX E/M VISIT ADD ON: HCPCS | Performed by: INTERNAL MEDICINE

## 2025-07-21 RX ORDER — WARFARIN 4 MG/1
4 TABLET ORAL NIGHTLY
Qty: 30 TABLET | Refills: 0 | Status: SHIPPED | OUTPATIENT
Start: 2025-07-21

## 2025-07-21 ASSESSMENT — ENCOUNTER SYMPTOMS
OCCASIONAL FEELINGS OF UNSTEADINESS: 0
LOSS OF SENSATION IN FEET: 0
DEPRESSION: 0

## 2025-07-21 NOTE — PROGRESS NOTES
"Subjective   Patient ID: Michi Mckinley is a 70 y.o. male who presents for INR (INR 2.3/Dose 4 mg of warfarin ).    HPI     Review of Systems    Objective   /74   Pulse 74   Ht 1.753 m (5' 9\")   Wt 90.7 kg (200 lb)   BMI 29.53 kg/m²     Physical Exam    Assessment/Plan          "

## 2025-08-18 DIAGNOSIS — D68.51 THROMBOPHILIA ASSOCIATED WITH DOUBLE HETEROZYGOSITY FOR PROTHROMBIN GENE MUTATION AND FACTOR V LEIDEN MUTATION: ICD-10-CM

## 2025-08-18 RX ORDER — WARFARIN 4 MG/1
4 TABLET ORAL NIGHTLY
Qty: 90 TABLET | Refills: 3 | Status: SHIPPED | OUTPATIENT
Start: 2025-08-18

## 2025-08-21 ENCOUNTER — APPOINTMENT (OUTPATIENT)
Dept: PRIMARY CARE | Facility: CLINIC | Age: 70
End: 2025-08-21
Payer: MEDICARE

## 2025-08-21 VITALS — SYSTOLIC BLOOD PRESSURE: 128 MMHG | DIASTOLIC BLOOD PRESSURE: 80 MMHG

## 2025-08-21 DIAGNOSIS — Z00.00 MEDICARE ANNUAL WELLNESS VISIT, SUBSEQUENT: ICD-10-CM

## 2025-08-21 DIAGNOSIS — Z79.01 CHRONIC ANTICOAGULATION: Primary | ICD-10-CM

## 2025-08-21 DIAGNOSIS — G25.81 RESTLESS LEGS SYNDROME: ICD-10-CM

## 2025-08-21 DIAGNOSIS — Z23 FLU VACCINE NEED: ICD-10-CM

## 2025-08-21 DIAGNOSIS — D68.51 THROMBOPHILIA ASSOCIATED WITH DOUBLE HETEROZYGOSITY FOR PROTHROMBIN GENE MUTATION AND FACTOR V LEIDEN MUTATION: ICD-10-CM

## 2025-08-21 DIAGNOSIS — K21.9 CHRONIC GERD: ICD-10-CM

## 2025-08-21 LAB
POC INR: 2.5 (ref 0.9–1.1)
POC PROTHROMBIN TIME: 29.8 (ref 9.3–12.5)

## 2025-08-21 PROCEDURE — G2211 COMPLEX E/M VISIT ADD ON: HCPCS | Performed by: INTERNAL MEDICINE

## 2025-08-21 PROCEDURE — 99212 OFFICE O/P EST SF 10 MIN: CPT | Performed by: INTERNAL MEDICINE

## 2025-08-21 PROCEDURE — 85610 PROTHROMBIN TIME: CPT | Performed by: INTERNAL MEDICINE

## 2025-08-21 PROCEDURE — 1160F RVW MEDS BY RX/DR IN RCRD: CPT | Performed by: INTERNAL MEDICINE

## 2025-08-21 PROCEDURE — 3079F DIAST BP 80-89 MM HG: CPT | Performed by: INTERNAL MEDICINE

## 2025-08-21 PROCEDURE — 3074F SYST BP LT 130 MM HG: CPT | Performed by: INTERNAL MEDICINE

## 2025-08-21 PROCEDURE — 1159F MED LIST DOCD IN RCRD: CPT | Performed by: INTERNAL MEDICINE

## 2025-08-21 RX ORDER — AMITRIPTYLINE HYDROCHLORIDE 25 MG/1
25 TABLET, FILM COATED ORAL NIGHTLY
Qty: 90 TABLET | Refills: 3 | Status: SHIPPED | OUTPATIENT
Start: 2025-08-21

## 2025-09-22 ENCOUNTER — APPOINTMENT (OUTPATIENT)
Dept: PRIMARY CARE | Facility: CLINIC | Age: 70
End: 2025-09-22
Payer: MEDICARE

## 2025-10-09 ENCOUNTER — APPOINTMENT (OUTPATIENT)
Dept: PRIMARY CARE | Facility: CLINIC | Age: 70
End: 2025-10-09
Payer: MEDICARE